# Patient Record
Sex: FEMALE | Race: WHITE | NOT HISPANIC OR LATINO | URBAN - METROPOLITAN AREA
[De-identification: names, ages, dates, MRNs, and addresses within clinical notes are randomized per-mention and may not be internally consistent; named-entity substitution may affect disease eponyms.]

---

## 2017-08-03 ENCOUNTER — OUTPATIENT (OUTPATIENT)
Dept: OUTPATIENT SERVICES | Facility: HOSPITAL | Age: 62
LOS: 1 days | Discharge: HOME | End: 2017-08-03

## 2017-08-03 DIAGNOSIS — R31.9 HEMATURIA, UNSPECIFIED: ICD-10-CM

## 2017-08-03 DIAGNOSIS — R19.7 DIARRHEA, UNSPECIFIED: ICD-10-CM

## 2017-08-03 DIAGNOSIS — E78.5 HYPERLIPIDEMIA, UNSPECIFIED: ICD-10-CM

## 2017-08-03 DIAGNOSIS — D55.9 ANEMIA DUE TO ENZYME DISORDER, UNSPECIFIED: ICD-10-CM

## 2017-08-03 DIAGNOSIS — D64.9 ANEMIA, UNSPECIFIED: ICD-10-CM

## 2017-08-03 DIAGNOSIS — E11.00 TYPE 2 DIABETES MELLITUS WITH HYPEROSMOLARITY WITHOUT NONKETOTIC HYPERGLYCEMIC-HYPEROSMOLAR COMA (NKHHC): ICD-10-CM

## 2017-08-03 DIAGNOSIS — E03.9 HYPOTHYROIDISM, UNSPECIFIED: ICD-10-CM

## 2017-08-03 DIAGNOSIS — R70.0 ELEVATED ERYTHROCYTE SEDIMENTATION RATE: ICD-10-CM

## 2017-08-03 DIAGNOSIS — E78.9 DISORDER OF LIPOPROTEIN METABOLISM, UNSPECIFIED: ICD-10-CM

## 2017-08-03 DIAGNOSIS — D51.9 VITAMIN B12 DEFICIENCY ANEMIA, UNSPECIFIED: ICD-10-CM

## 2017-08-03 DIAGNOSIS — D50.9 IRON DEFICIENCY ANEMIA, UNSPECIFIED: ICD-10-CM

## 2017-08-03 DIAGNOSIS — E09.9 DRUG OR CHEMICAL INDUCED DIABETES MELLITUS WITHOUT COMPLICATIONS: ICD-10-CM

## 2017-08-03 DIAGNOSIS — Z02.9 ENCOUNTER FOR ADMINISTRATIVE EXAMINATIONS, UNSPECIFIED: ICD-10-CM

## 2017-08-04 ENCOUNTER — OUTPATIENT (OUTPATIENT)
Dept: OUTPATIENT SERVICES | Facility: HOSPITAL | Age: 62
LOS: 1 days | Discharge: HOME | End: 2017-08-04

## 2017-08-04 DIAGNOSIS — R19.7 DIARRHEA, UNSPECIFIED: ICD-10-CM

## 2017-08-15 DIAGNOSIS — D50.9 IRON DEFICIENCY ANEMIA, UNSPECIFIED: ICD-10-CM

## 2017-08-15 DIAGNOSIS — E11.9 TYPE 2 DIABETES MELLITUS WITHOUT COMPLICATIONS: ICD-10-CM

## 2017-08-15 DIAGNOSIS — E55.9 VITAMIN D DEFICIENCY, UNSPECIFIED: ICD-10-CM

## 2017-08-15 DIAGNOSIS — Z79.899 OTHER LONG TERM (CURRENT) DRUG THERAPY: ICD-10-CM

## 2017-08-15 DIAGNOSIS — E78.6 LIPOPROTEIN DEFICIENCY: ICD-10-CM

## 2017-08-15 DIAGNOSIS — R31.9 HEMATURIA, UNSPECIFIED: ICD-10-CM

## 2017-08-15 DIAGNOSIS — R19.8 OTHER SPECIFIED SYMPTOMS AND SIGNS INVOLVING THE DIGESTIVE SYSTEM AND ABDOMEN: ICD-10-CM

## 2017-08-15 DIAGNOSIS — D64.3 OTHER SIDEROBLASTIC ANEMIAS: ICD-10-CM

## 2018-02-06 ENCOUNTER — EMERGENCY (EMERGENCY)
Facility: HOSPITAL | Age: 63
LOS: 1 days | Discharge: HOME | End: 2018-02-06
Attending: EMERGENCY MEDICINE

## 2018-02-06 VITALS
DIASTOLIC BLOOD PRESSURE: 92 MMHG | SYSTOLIC BLOOD PRESSURE: 140 MMHG | OXYGEN SATURATION: 95 % | RESPIRATION RATE: 20 BRPM | HEART RATE: 78 BPM

## 2018-02-06 DIAGNOSIS — L29.9 PRURITUS, UNSPECIFIED: ICD-10-CM

## 2018-02-06 DIAGNOSIS — R60.0 LOCALIZED EDEMA: ICD-10-CM

## 2018-02-06 DIAGNOSIS — E78.5 HYPERLIPIDEMIA, UNSPECIFIED: ICD-10-CM

## 2018-02-06 DIAGNOSIS — I10 ESSENTIAL (PRIMARY) HYPERTENSION: ICD-10-CM

## 2018-02-06 DIAGNOSIS — Z88.0 ALLERGY STATUS TO PENICILLIN: ICD-10-CM

## 2018-02-06 DIAGNOSIS — E11.9 TYPE 2 DIABETES MELLITUS WITHOUT COMPLICATIONS: ICD-10-CM

## 2018-02-06 DIAGNOSIS — Z79.899 OTHER LONG TERM (CURRENT) DRUG THERAPY: ICD-10-CM

## 2018-02-07 VITALS — TEMPERATURE: 98 F

## 2018-02-07 LAB
ALBUMIN SERPL ELPH-MCNC: 3.3 G/DL — SIGNIFICANT CHANGE UP (ref 3–5.5)
ALP SERPL-CCNC: 65 U/L — SIGNIFICANT CHANGE UP (ref 30–115)
ALT FLD-CCNC: 9 U/L — SIGNIFICANT CHANGE UP (ref 0–41)
ANION GAP SERPL CALC-SCNC: 6 MMOL/L — LOW (ref 7–14)
AST SERPL-CCNC: 11 U/L — SIGNIFICANT CHANGE UP (ref 0–41)
BASOPHILS # BLD AUTO: 0 K/UL — SIGNIFICANT CHANGE UP (ref 0–0.2)
BASOPHILS NFR BLD AUTO: 0 % — SIGNIFICANT CHANGE UP (ref 0–1)
BILIRUB SERPL-MCNC: 0.4 MG/DL — SIGNIFICANT CHANGE UP (ref 0.2–1.2)
BUN SERPL-MCNC: 27 MG/DL — HIGH (ref 10–20)
CALCIUM SERPL-MCNC: 9.2 MG/DL — SIGNIFICANT CHANGE UP (ref 8.5–10.1)
CHLORIDE SERPL-SCNC: 111 MMOL/L — HIGH (ref 98–110)
CO2 SERPL-SCNC: 26 MMOL/L — SIGNIFICANT CHANGE UP (ref 17–32)
CREAT SERPL-MCNC: 0.9 MG/DL — SIGNIFICANT CHANGE UP (ref 0.7–1.5)
EOSINOPHIL # BLD AUTO: 0.31 K/UL — SIGNIFICANT CHANGE UP (ref 0–0.7)
EOSINOPHIL NFR BLD AUTO: 4.8 % — SIGNIFICANT CHANGE UP (ref 0–8)
GLUCOSE SERPL-MCNC: 133 MG/DL — HIGH (ref 70–110)
HCT VFR BLD CALC: 32.7 % — LOW (ref 37–47)
HGB BLD-MCNC: 10.3 G/DL — LOW (ref 14–18)
IMM GRANULOCYTES NFR BLD AUTO: 0.3 % — SIGNIFICANT CHANGE UP (ref 0.1–0.3)
LYMPHOCYTES # BLD AUTO: 1.91 K/UL — SIGNIFICANT CHANGE UP (ref 1.2–3.4)
LYMPHOCYTES # BLD AUTO: 29.6 % — SIGNIFICANT CHANGE UP (ref 20.5–51.1)
MCHC RBC-ENTMCNC: 27 PG — SIGNIFICANT CHANGE UP (ref 27–31)
MCHC RBC-ENTMCNC: 31.5 G/DL — LOW (ref 32–37)
MCV RBC AUTO: 85.8 FL — SIGNIFICANT CHANGE UP (ref 81–91)
MONOCYTES # BLD AUTO: 0.6 K/UL — SIGNIFICANT CHANGE UP (ref 0.1–0.6)
MONOCYTES NFR BLD AUTO: 9.3 % — SIGNIFICANT CHANGE UP (ref 1.7–9.3)
NEUTROPHILS # BLD AUTO: 3.61 K/UL — SIGNIFICANT CHANGE UP (ref 1.4–6.5)
NEUTROPHILS NFR BLD AUTO: 56 % — SIGNIFICANT CHANGE UP (ref 42.2–75.2)
NRBC # BLD: 0 /100 WBCS — SIGNIFICANT CHANGE UP (ref 0–0)
PLATELET # BLD AUTO: 169 K/UL — SIGNIFICANT CHANGE UP (ref 130–400)
POTASSIUM SERPL-MCNC: 4.3 MMOL/L — SIGNIFICANT CHANGE UP (ref 3.5–5)
POTASSIUM SERPL-SCNC: 4.3 MMOL/L — SIGNIFICANT CHANGE UP (ref 3.5–5)
PROT SERPL-MCNC: 5.5 G/DL — LOW (ref 6–8)
RBC # BLD: 3.81 M/UL — LOW (ref 4.2–5.4)
RBC # FLD: 14 % — SIGNIFICANT CHANGE UP (ref 11.5–14.5)
SODIUM SERPL-SCNC: 143 MMOL/L — SIGNIFICANT CHANGE UP (ref 135–146)
WBC # BLD: 6.45 K/UL — SIGNIFICANT CHANGE UP (ref 4.8–10.8)
WBC # FLD AUTO: 6.45 K/UL — SIGNIFICANT CHANGE UP (ref 4.8–10.8)

## 2018-02-07 RX ORDER — DIPHENHYDRAMINE HCL 50 MG
50 CAPSULE ORAL EVERY 4 HOURS
Qty: 0 | Refills: 0 | Status: DISCONTINUED | OUTPATIENT
Start: 2018-02-07 | End: 2018-02-12

## 2018-02-07 RX ADMIN — Medication 60 MILLIGRAM(S): at 03:54

## 2018-02-07 RX ADMIN — Medication 50 MILLIGRAM(S): at 03:54

## 2018-02-07 NOTE — ED PROVIDER NOTE - PHYSICAL EXAMINATION
CONSTITUTIONAL: Well-developed; well-nourished; in no acute distress.   SKIN: warm, dry  HEAD: Normocephalic; atraumatic.  EYES: PERRL, EOMI, no conjunctival erythema  ENT: No nasal discharge; airway clear.  NECK: Supple; non tender.  CARD: S1, S2 normal; no murmurs, gallops, or rubs. Regular rate and rhythm.   RESP: No wheezes, rales or rhonchi.  ABD: soft ntnd  EXT: +mild pitting edema to hands & dorsal feet, R=L, 2 rings stuck on R 2nd digit & L 4th digit - removed w/ring cutter, digits nvi, cr<2s; normal ROM.  No clubbing, cyanosis.  LYMPH: No acute cervical adenopathy.  NEURO: Alert, oriented, grossly unremarkable  PSYCH: Cooperative, appropriate.

## 2018-02-07 NOTE — ED PROVIDER NOTE - NS ED ROS FT
Eyes:  No visual changes, eye pain or discharge.  ENMT:  No hearing changes, pain, no sore throat or runny nose, no difficulty swallowing  Cardiac:  No chest pain, SOB. No chest pain with exertion.  Respiratory:  No cough or respiratory distress. No hemoptysis. No history of asthma or RAD.  GI:  No nausea, vomiting, diarrhea or abdominal pain.  :  No dysuria, frequency or burning.  MS:  No myalgia, muscle weakness, joint pain or back pain.  Neuro:  No headache or weakness.  No LOC.  Skin:  see hpi  Endocrine: +dm

## 2018-02-07 NOTE — ED ADULT NURSE NOTE - PMH
Hypertension, unspecified type    Neuropathy    Other osteoporosis    Type 2 diabetes mellitus without complication, unspecified long term insulin use status

## 2018-04-04 ENCOUNTER — OUTPATIENT (OUTPATIENT)
Dept: OUTPATIENT SERVICES | Facility: HOSPITAL | Age: 63
LOS: 1 days | Discharge: HOME | End: 2018-04-04

## 2018-04-04 DIAGNOSIS — E07.9 DISORDER OF THYROID, UNSPECIFIED: ICD-10-CM

## 2018-04-04 DIAGNOSIS — R19.8 OTHER SPECIFIED SYMPTOMS AND SIGNS INVOLVING THE DIGESTIVE SYSTEM AND ABDOMEN: ICD-10-CM

## 2018-04-04 DIAGNOSIS — D51.9 VITAMIN B12 DEFICIENCY ANEMIA, UNSPECIFIED: ICD-10-CM

## 2018-04-04 DIAGNOSIS — E78.9 DISORDER OF LIPOPROTEIN METABOLISM, UNSPECIFIED: ICD-10-CM

## 2018-04-04 DIAGNOSIS — R31.9 HEMATURIA, UNSPECIFIED: ICD-10-CM

## 2018-04-04 DIAGNOSIS — E78.5 HYPERLIPIDEMIA, UNSPECIFIED: ICD-10-CM

## 2018-04-04 DIAGNOSIS — R94.5 ABNORMAL RESULTS OF LIVER FUNCTION STUDIES: ICD-10-CM

## 2018-04-04 DIAGNOSIS — I10 ESSENTIAL (PRIMARY) HYPERTENSION: ICD-10-CM

## 2018-04-04 DIAGNOSIS — E13.9 OTHER SPECIFIED DIABETES MELLITUS WITHOUT COMPLICATIONS: ICD-10-CM

## 2018-04-04 DIAGNOSIS — D50.9 IRON DEFICIENCY ANEMIA, UNSPECIFIED: ICD-10-CM

## 2018-04-04 DIAGNOSIS — D64.3 OTHER SIDEROBLASTIC ANEMIAS: ICD-10-CM

## 2018-04-04 DIAGNOSIS — E11.00 TYPE 2 DIABETES MELLITUS WITH HYPEROSMOLARITY WITHOUT NONKETOTIC HYPERGLYCEMIC-HYPEROSMOLAR COMA (NKHHC): ICD-10-CM

## 2018-04-04 DIAGNOSIS — D03.9 MELANOMA IN SITU, UNSPECIFIED: ICD-10-CM

## 2018-05-18 ENCOUNTER — OUTPATIENT (OUTPATIENT)
Dept: OUTPATIENT SERVICES | Facility: HOSPITAL | Age: 63
LOS: 1 days | Discharge: HOME | End: 2018-05-18

## 2018-05-18 DIAGNOSIS — E78.5 HYPERLIPIDEMIA, UNSPECIFIED: ICD-10-CM

## 2018-05-18 DIAGNOSIS — I10 ESSENTIAL (PRIMARY) HYPERTENSION: ICD-10-CM

## 2018-05-18 DIAGNOSIS — G62.9 POLYNEUROPATHY, UNSPECIFIED: ICD-10-CM

## 2018-12-23 ENCOUNTER — EMERGENCY (EMERGENCY)
Facility: HOSPITAL | Age: 63
LOS: 0 days | Discharge: HOME | End: 2018-12-23
Attending: EMERGENCY MEDICINE | Admitting: EMERGENCY MEDICINE

## 2018-12-23 VITALS
DIASTOLIC BLOOD PRESSURE: 59 MMHG | TEMPERATURE: 97 F | RESPIRATION RATE: 16 BRPM | SYSTOLIC BLOOD PRESSURE: 127 MMHG | OXYGEN SATURATION: 96 % | HEART RATE: 80 BPM

## 2018-12-23 VITALS
RESPIRATION RATE: 15 BRPM | OXYGEN SATURATION: 98 % | DIASTOLIC BLOOD PRESSURE: 69 MMHG | SYSTOLIC BLOOD PRESSURE: 131 MMHG | HEART RATE: 81 BPM

## 2018-12-23 DIAGNOSIS — F19.10 OTHER PSYCHOACTIVE SUBSTANCE ABUSE, UNCOMPLICATED: ICD-10-CM

## 2018-12-23 DIAGNOSIS — Z88.0 ALLERGY STATUS TO PENICILLIN: ICD-10-CM

## 2018-12-23 DIAGNOSIS — R55 SYNCOPE AND COLLAPSE: ICD-10-CM

## 2018-12-23 DIAGNOSIS — E11.9 TYPE 2 DIABETES MELLITUS WITHOUT COMPLICATIONS: ICD-10-CM

## 2018-12-23 DIAGNOSIS — E78.5 HYPERLIPIDEMIA, UNSPECIFIED: ICD-10-CM

## 2018-12-23 DIAGNOSIS — Z79.899 OTHER LONG TERM (CURRENT) DRUG THERAPY: ICD-10-CM

## 2018-12-23 LAB
ALBUMIN SERPL ELPH-MCNC: 4 G/DL — SIGNIFICANT CHANGE UP (ref 3.5–5.2)
ALP SERPL-CCNC: 76 U/L — SIGNIFICANT CHANGE UP (ref 30–115)
ALT FLD-CCNC: 7 U/L — SIGNIFICANT CHANGE UP (ref 0–41)
ANION GAP SERPL CALC-SCNC: 16 MMOL/L — HIGH (ref 7–14)
APAP SERPL-MCNC: <5 UG/ML — LOW (ref 10–30)
AST SERPL-CCNC: 20 U/L — SIGNIFICANT CHANGE UP (ref 0–41)
BASOPHILS # BLD AUTO: 0.01 K/UL — SIGNIFICANT CHANGE UP (ref 0–0.2)
BASOPHILS NFR BLD AUTO: 0.1 % — SIGNIFICANT CHANGE UP (ref 0–1)
BILIRUB SERPL-MCNC: <0.2 MG/DL — SIGNIFICANT CHANGE UP (ref 0.2–1.2)
BUN SERPL-MCNC: 40 MG/DL — HIGH (ref 10–20)
CALCIUM SERPL-MCNC: 9.4 MG/DL — SIGNIFICANT CHANGE UP (ref 8.5–10.1)
CHLORIDE SERPL-SCNC: 98 MMOL/L — SIGNIFICANT CHANGE UP (ref 98–110)
CO2 SERPL-SCNC: 22 MMOL/L — SIGNIFICANT CHANGE UP (ref 17–32)
CREAT SERPL-MCNC: 1.7 MG/DL — HIGH (ref 0.7–1.5)
EOSINOPHIL # BLD AUTO: 0.2 K/UL — SIGNIFICANT CHANGE UP (ref 0–0.7)
EOSINOPHIL NFR BLD AUTO: 2.8 % — SIGNIFICANT CHANGE UP (ref 0–8)
ETHANOL SERPL-MCNC: <10 MG/DL — HIGH
GAS PNL BLDV: SIGNIFICANT CHANGE UP
GLUCOSE SERPL-MCNC: 118 MG/DL — HIGH (ref 70–99)
HCT VFR BLD CALC: 33.3 % — LOW (ref 37–47)
HGB BLD-MCNC: 10.1 G/DL — LOW (ref 12–16)
IMM GRANULOCYTES NFR BLD AUTO: 0.4 % — HIGH (ref 0.1–0.3)
LYMPHOCYTES # BLD AUTO: 1.61 K/UL — SIGNIFICANT CHANGE UP (ref 1.2–3.4)
LYMPHOCYTES # BLD AUTO: 22.3 % — SIGNIFICANT CHANGE UP (ref 20.5–51.1)
MCHC RBC-ENTMCNC: 26.4 PG — LOW (ref 27–31)
MCHC RBC-ENTMCNC: 30.3 G/DL — LOW (ref 32–37)
MCV RBC AUTO: 87.2 FL — SIGNIFICANT CHANGE UP (ref 81–99)
MONOCYTES # BLD AUTO: 0.8 K/UL — HIGH (ref 0.1–0.6)
MONOCYTES NFR BLD AUTO: 11.1 % — HIGH (ref 1.7–9.3)
NEUTROPHILS # BLD AUTO: 4.58 K/UL — SIGNIFICANT CHANGE UP (ref 1.4–6.5)
NEUTROPHILS NFR BLD AUTO: 63.3 % — SIGNIFICANT CHANGE UP (ref 42.2–75.2)
PLATELET # BLD AUTO: 204 K/UL — SIGNIFICANT CHANGE UP (ref 130–400)
POTASSIUM SERPL-MCNC: 5.5 MMOL/L — HIGH (ref 3.5–5)
POTASSIUM SERPL-SCNC: 5.5 MMOL/L — HIGH (ref 3.5–5)
PROT SERPL-MCNC: 6.9 G/DL — SIGNIFICANT CHANGE UP (ref 6–8)
RBC # BLD: 3.82 M/UL — LOW (ref 4.2–5.4)
RBC # FLD: 14.3 % — SIGNIFICANT CHANGE UP (ref 11.5–14.5)
SALICYLATES SERPL-MCNC: <0.3 MG/DL — LOW (ref 4–30)
SODIUM SERPL-SCNC: 136 MMOL/L — SIGNIFICANT CHANGE UP (ref 135–146)
TROPONIN T SERPL-MCNC: <0.01 NG/ML — SIGNIFICANT CHANGE UP
WBC # BLD: 7.23 K/UL — SIGNIFICANT CHANGE UP (ref 4.8–10.8)
WBC # FLD AUTO: 7.23 K/UL — SIGNIFICANT CHANGE UP (ref 4.8–10.8)

## 2018-12-23 NOTE — ED PROVIDER NOTE - ATTENDING CONTRIBUTION TO CARE
63 F to ED with syncope? bystander called EMs and she was found down.  responded to naloxone by EMS  and has no complaints.  pt somnolent but rousable states she was shopping at [employer]    takes 4mg dilaudid Q4h as prescribed.    AVSS, exam as noted, CTAB, RRR, abdomen soft NTND, (+) bowel sounds, neuro nonfocal

## 2018-12-23 NOTE — ED PROVIDER NOTE - NS ED ROS FT
Eyes:  No visual changes, eye pain or discharge.  ENMT: no sore throat or runny nose, no difficulty swallowing  Cardiac:  No chest pain, SOB   Respiratory:  No cough or respiratory distress.    GI:  No nausea, vomiting, diarrhea or abdominal pain.  :  No dysuria, frequency or burning.  MS:  No  joint pain or back pain.  Neuro:  No headache or weakness.   Skin:  No skin rash.   Endocrine: No history of thyroid disease or diabetes.

## 2018-12-23 NOTE — ED PROVIDER NOTE - NSFOLLOWUPINSTRUCTIONS_ED_ALL_ED_FT
Please follow up with your primary care physician in 1-2 days.     Substance Abuse    Chemical dependency is an addiction to drugs or alcohol. It is characterized by the repeated behavior of seeking out and using drugs and alcohol despite harmful consequences to the health and safety of oneself and others. Using drugs in a manner that brought you to an Emergency Room suggests you may have an drug abuse problem. Seek help at a drug addiction center.    SEEK IMMEDIATE MEDICAL CARE IF YOU HAVE ANY OF THE FOLLOWING SYMPTOMS: chest pain, shortness of breath, change in mental status, thoughts about hurting killing yourself, thoughts about hurting or killing somebody else, hallucinations, or worsening depression.

## 2018-12-23 NOTE — ED PROVIDER NOTE - PHYSICAL EXAMINATION
CONSTITUTIONAL: Well-developed; well-nourished; in no acute distress.   SKIN: warm, dry  HEAD: Normocephalic; atraumatic.  EYES: PERRL, EOMI, no conjunctival erythema, 3mm pupils reactive bilaterally   ENT: No nasal discharge; airway clear.  NECK: Supple; non tender.  CARD: S1, S2 normal;  Regular rate and rhythm.   RESP: No wheezes, rales or rhonchi. good air movement bilaterally   ABD: soft non tender, non distended, no rebound or guarding  EXT: Normal ROM.    LYMPH: No acute cervical adenopathy.  NEURO: Alert, oriented, grossly unremarkable.

## 2018-12-23 NOTE — ED PROVIDER NOTE - OBJECTIVE STATEMENT
62 yo F pmh of DM, HLD, back pain, neuropathy on dilaudid and lyrica from chronic pain presents with syncopal episode. Patient was [place] the [employer] when she bent over and then was helped up by EMS. She is unclear of circumstances. As per EMS they found her unresponsive and gave narcan with some improvement. Currently patient is awake, alert and answering questions. no CP, no sob, no n/v/d, no abdominal pain.

## 2018-12-23 NOTE — ED ADULT NURSE NOTE - OBJECTIVE STATEMENT
Pt was brought in by EMS for syncopal episode at [facility]. Pt states she took her normal dose of Dilaudid that shes been taking for years for chronic pain. narcan administered by EMS

## 2018-12-24 PROBLEM — M81.8 OTHER OSTEOPOROSIS WITHOUT CURRENT PATHOLOGICAL FRACTURE: Chronic | Status: ACTIVE | Noted: 2018-02-07

## 2018-12-24 PROBLEM — G62.9 POLYNEUROPATHY, UNSPECIFIED: Chronic | Status: ACTIVE | Noted: 2018-02-07

## 2018-12-24 PROBLEM — I10 ESSENTIAL (PRIMARY) HYPERTENSION: Chronic | Status: ACTIVE | Noted: 2018-02-07

## 2018-12-24 PROBLEM — E11.9 TYPE 2 DIABETES MELLITUS WITHOUT COMPLICATIONS: Chronic | Status: ACTIVE | Noted: 2018-02-07

## 2019-04-18 ENCOUNTER — OUTPATIENT (OUTPATIENT)
Dept: OUTPATIENT SERVICES | Facility: HOSPITAL | Age: 64
LOS: 1 days | Discharge: HOME | End: 2019-04-18

## 2019-04-18 DIAGNOSIS — E11.9 TYPE 2 DIABETES MELLITUS WITHOUT COMPLICATIONS: ICD-10-CM

## 2019-04-18 DIAGNOSIS — E78.5 HYPERLIPIDEMIA, UNSPECIFIED: ICD-10-CM

## 2019-04-18 DIAGNOSIS — I50.32 CHRONIC DIASTOLIC (CONGESTIVE) HEART FAILURE: ICD-10-CM

## 2019-04-18 DIAGNOSIS — I10 ESSENTIAL (PRIMARY) HYPERTENSION: ICD-10-CM

## 2019-06-05 ENCOUNTER — OUTPATIENT (OUTPATIENT)
Dept: OUTPATIENT SERVICES | Facility: HOSPITAL | Age: 64
LOS: 1 days | Discharge: HOME | End: 2019-06-05

## 2019-06-05 DIAGNOSIS — M15.0 PRIMARY GENERALIZED (OSTEO)ARTHRITIS: ICD-10-CM

## 2019-06-05 DIAGNOSIS — M96.1 POSTLAMINECTOMY SYNDROME, NOT ELSEWHERE CLASSIFIED: ICD-10-CM

## 2019-06-05 DIAGNOSIS — M54.89 OTHER DORSALGIA: ICD-10-CM

## 2019-11-14 ENCOUNTER — OUTPATIENT (OUTPATIENT)
Dept: OUTPATIENT SERVICES | Facility: HOSPITAL | Age: 64
LOS: 1 days | Discharge: HOME | End: 2019-11-14

## 2019-11-14 DIAGNOSIS — N18.3 CHRONIC KIDNEY DISEASE, STAGE 3 (MODERATE): ICD-10-CM

## 2019-11-19 ENCOUNTER — OUTPATIENT (OUTPATIENT)
Dept: OUTPATIENT SERVICES | Facility: HOSPITAL | Age: 64
LOS: 1 days | Discharge: HOME | End: 2019-11-19

## 2019-11-19 DIAGNOSIS — N18.9 CHRONIC KIDNEY DISEASE, UNSPECIFIED: ICD-10-CM

## 2020-05-18 ENCOUNTER — EMERGENCY (EMERGENCY)
Facility: HOSPITAL | Age: 65
LOS: 0 days | Discharge: HOME | End: 2020-05-18
Attending: EMERGENCY MEDICINE | Admitting: EMERGENCY MEDICINE
Payer: COMMERCIAL

## 2020-05-18 VITALS
RESPIRATION RATE: 18 BRPM | HEART RATE: 78 BPM | SYSTOLIC BLOOD PRESSURE: 128 MMHG | TEMPERATURE: 97 F | OXYGEN SATURATION: 98 % | DIASTOLIC BLOOD PRESSURE: 68 MMHG

## 2020-05-18 DIAGNOSIS — E78.5 HYPERLIPIDEMIA, UNSPECIFIED: ICD-10-CM

## 2020-05-18 DIAGNOSIS — M25.562 PAIN IN LEFT KNEE: ICD-10-CM

## 2020-05-18 DIAGNOSIS — Z88.0 ALLERGY STATUS TO PENICILLIN: ICD-10-CM

## 2020-05-18 DIAGNOSIS — E11.9 TYPE 2 DIABETES MELLITUS WITHOUT COMPLICATIONS: ICD-10-CM

## 2020-05-18 DIAGNOSIS — I10 ESSENTIAL (PRIMARY) HYPERTENSION: ICD-10-CM

## 2020-05-18 PROCEDURE — 93970 EXTREMITY STUDY: CPT | Mod: 26

## 2020-05-18 PROCEDURE — 99284 EMERGENCY DEPT VISIT MOD MDM: CPT

## 2020-05-18 PROCEDURE — 73562 X-RAY EXAM OF KNEE 3: CPT | Mod: 26,LT

## 2020-05-18 NOTE — ED PROVIDER NOTE - NS ED ROS FT
Review of Systems:  	•	CONSTITUTIONAL: no fever, no diaphoresis, no chills  	•	SKIN: no rash  	•	HEMATOLOGIC: no bleeding, no bruising  	•	MUSCULOSKELETAL: +L knee pain, +swelling. no redness   	•	NEUROLOGIC: no weakness, no numbness. no syncope

## 2020-05-18 NOTE — ED PROVIDER NOTE - CARE PROVIDER_API CALL
your primary care doctor,   Phone: (   )    -  Fax: (   )    -  Follow Up Time: 1-3 Days    Naresh Rivers  ORTHOPAEDIC SURGERY  [address] [place] [id number]  Phone: [phone]  Fax: [phone]  Follow Up Time: 1-3 Days

## 2020-05-18 NOTE — ED PROVIDER NOTE - OBJECTIVE STATEMENT
64 year old female, past medical history HTN, HDL, DM, OA, neuropathy, who presents with L knee pain x4 days. Patient reports chronic b/l knee pain,  managed by pain management w/ steroid injections. States R knee w/o worsening pain, however, reports gradual worsening of L knee pain w/ swelling. No recent fall or trauma. No hx blood clots, hemoptysis, estrogen use, calf tenderness. patient non-smoker. No fever, chills, hip/ankle pain, skin changes.

## 2020-05-18 NOTE — ED PROVIDER NOTE - NSFOLLOWUPCLINICS_GEN_ALL_ED_FT
[facility]  Orthpedic  [address], [place]   Phone: [phone]  Fax:   Follow Up Time: 1-3 Days

## 2020-05-18 NOTE — ED PROVIDER NOTE - PHYSICAL EXAMINATION
CONST: Well appearing in NAD  MS: LLE: With swelling and tenderness to knee, no overlying skin changes, no calf tenderness. Passive/active knee extension/flexion intact. Pelvis stable. Pulses 2+ bilaterally. No midline spinal tenderness  SKIN: Warm, dry, no acute rashes.  NEURO: A&Ox3, No focal deficits. Strength 5/5 LE bilaterally with no sensory deficits. Steady gait.

## 2020-05-18 NOTE — ED PROVIDER NOTE - PATIENT PORTAL LINK FT
You can access the FollowMyHealth Patient Portal offered by [facility] by registering at the following website: [email or web address]. By joining [email or web address]’s FollowMyHealth portal, you will also be able to view your health information using other applications (apps) compatible with our system.

## 2020-05-18 NOTE — ED PROVIDER NOTE - ATTENDING CONTRIBUTION TO CARE
I personally evaluated the patient. I reviewed the Resident’s or Physician Assistant’s note (as assigned above), and agree with the findings and plan except as documented in my note.    Pt is a 65 y/o female with PMH of HTN, DLD, DM, chronic knee pain, presents to ED for worsening L knee pain for several days, mild, constant, no change since onset. Pt states has received injections in past that have helped. No recent trauma. No SOB, chest pain.    Constitutional: Well developed, well nourished. NAD.  Head: Normocephalic.  Eyes: EOMI.  Cardiovascular: Normal S1, S2. Regular rate and rhythm. No murmurs, rubs, or gallops.  Pulmonary: Normal respiratory rate and effort. Lungs clear to auscultation bilaterally. No wheezing, rales, or rhonchi.  MS: + left knee tenderness, diffuse, NVI distally. Able to ambulate.  Neuro: No focal neurological deficits.

## 2020-05-18 NOTE — ED ADULT TRIAGE NOTE - CHIEF COMPLAINT QUOTE
Pt c/o b/l knee pain; pain is chronic. Pt gets injections in b/l knees for the pain, last being in January. Pain in her left knee is currently severe, making it difficult to bear weight/ambulate. MD sent pt in to r/o blood clot.

## 2020-05-18 NOTE — ED PROVIDER NOTE - PROVIDER TOKENS
FREE:[LAST:[your primary care doctor],PHONE:[(   )    -],FAX:[(   )    -],FOLLOWUP:[1-3 Days]],PROVIDER:[TOKEN:[[id number]],FOLLOWUP:[1-3 Days]]

## 2020-05-18 NOTE — ED PROVIDER NOTE - CLINICAL SUMMARY MEDICAL DECISION MAKING FREE TEXT BOX
Pt presented to ED for acute on chronic knee pain, no recent trauma. XR and venous duplex obtained. No acute fx/dislocation or DVT. Will d/c. Results reviewed and discussed with pt and printed for patient. Anticipatory guidance given including close outpatient followup. Strict return precautions given. Pt verbalizes understanding of and agrees with plan.

## 2020-05-18 NOTE — ED PROVIDER NOTE - CARE PROVIDERS DIRECT ADDRESSES
,DirectAddress_Unknown,[email or web address]

## 2020-05-18 NOTE — ED PROVIDER NOTE - NSFOLLOWUPINSTRUCTIONS_ED_ALL_ED_FT
Please follow up with your primary care doctor, pain management and orthopedics in 1-3 days  Please be aware of any new or worsening signs or symptoms that should prompt your return to the ER     Knee Pain    Knee pain is a very common symptom and can have many causes. Knee pain often goes away when you follow your health care provider's instructions for relieving pain and discomfort at home. However, knee pain can develop into a condition that needs treatment. Some conditions may include:     Arthritis caused by wear and tear (osteoarthritis).  Arthritis caused by swelling and irritation (rheumatoid arthritis or gout).  A cyst or growth in your knee.  An infection in your knee joint.  An injury that will not heal.  Damage, swelling, or irritation of the tissues that support your knee (torn ligaments or tendinitis).    If your knee pain continues, additional tests may be ordered to diagnose your condition. Tests may include X-rays or other imaging studies of your knee. You may also need to have fluid removed from your knee. Treatment for ongoing knee pain depends on the cause, but treatment may include:    Medicines to relieve pain or swelling.  Steroid injections in your knee.  Physical therapy.  Surgery.    HOME CARE INSTRUCTIONS  Take medicines only as directed by your health care provider.   Rest your knee and keep it raised (elevated) while you are resting.  Do not do things that cause or worsen pain.  Avoid high-impact activities or exercises, such as running, jumping rope, or doing jumping jacks.  Apply ice to the knee area:  Put ice in a plastic bag.  Place a towel between your skin and the bag.  Leave the ice on for 20 minutes, 2–3 times a day.  Ask your health care provider if you should wear an elastic knee support.  Keep a pillow under your knee when you sleep.  Lose weight if you are overweight. Extra weight can put pressure on your knee.  Do not use any tobacco products, including cigarettes, chewing tobacco, or electronic cigarettes. If you need help quitting, ask your health care provider. Smoking may slow the healing of any bone and joint problems that you may have.    SEEK MEDICAL CARE IF:  Your knee pain continues, changes, or gets worse.  You have a fever along with knee pain.  Your knee justine or locks up.  Your knee becomes more swollen.    SEEK IMMEDIATE MEDICAL CARE IF:  Your knee joint feels hot to the touch.  You have chest pain or trouble breathing.

## 2020-05-18 NOTE — ED PROVIDER NOTE - PROGRESS NOTE DETAILS
I supervised PA fellow care vascular negative. Results d/w patient and copies of results provided.  Pt instructed to return if any worsening symptoms or concerns.  Discussed with patient follow up and care plan. They verbalize understanding all discussed and all questions answered.

## 2021-02-01 PROBLEM — Z00.00 ENCOUNTER FOR PREVENTIVE HEALTH EXAMINATION: Status: ACTIVE | Noted: 2021-02-01

## 2021-02-03 ENCOUNTER — APPOINTMENT (OUTPATIENT)
Dept: RHEUMATOLOGY | Facility: CLINIC | Age: 66
End: 2021-02-03

## 2021-02-19 ENCOUNTER — APPOINTMENT (OUTPATIENT)
Dept: RHEUMATOLOGY | Facility: CLINIC | Age: 66
End: 2021-02-19
Payer: MEDICARE

## 2021-02-19 DIAGNOSIS — E78.5 HYPERLIPIDEMIA, UNSPECIFIED: ICD-10-CM

## 2021-02-19 DIAGNOSIS — M17.10 UNILATERAL PRIMARY OSTEOARTHRITIS, UNSPECIFIED KNEE: ICD-10-CM

## 2021-02-19 DIAGNOSIS — M25.50 PAIN IN UNSPECIFIED JOINT: ICD-10-CM

## 2021-02-19 DIAGNOSIS — E11.9 TYPE 2 DIABETES MELLITUS W/OUT COMPLICATIONS: ICD-10-CM

## 2021-02-19 PROCEDURE — 99443: CPT

## 2021-02-19 NOTE — REVIEW OF SYSTEMS
[As Noted in HPI] : as noted in HPI [Arthralgias] : arthralgias [Joint Pain] : joint pain [Joint Swelling] : joint swelling [Negative] : Psychiatric

## 2021-02-21 NOTE — REASON FOR VISIT
[Initial Evaluation] : an initial evaluation [Home] : at home, [unfilled] , at the time of the visit. [Medical Office: ([facility])___] : at the medical office located in  [Verbal consent obtained from patient] : the patient, [unfilled]

## 2021-02-21 NOTE — ASSESSMENT
[FreeTextEntry1] : Osteoarthritis with multiple joints involvement:\par knee xray done last year (see HIE) shows b/l degenerative disease as well as proliferative disease.\par will recommend xray shoulder and ankle \par lab work for inflammatory markers to r/o systematic inflammatory disease\par pain control as per pain management \par will recommend discussing with primary to start on cymbalta pending work up\par follow up in 6 weeks in person visit for further management.

## 2021-02-21 NOTE — HISTORY OF PRESENT ILLNESS
[Home] : at home, [unfilled] , at the time of the visit. [Medical Office: ([facility])___] : at the medical office located in  [Verbal consent obtained from patient] : the patient, [unfilled] [Arthralgias] : arthralgias [Joint Swelling] : joint swelling [Difficulty Standing] : difficulty standing [Difficulty Walking] : difficulty walking [FreeTextEntry1] : 66 yo male with PMH of osteoarthritis, CKD?stage, HTN, DM, neuropathy had telephonic appointment today.\par \par she complains of multiple joint pain. she has h/o multiple back surgery and currently following with pain management doctor and takes dilaudid 16 mg SR twice daily.\par \par she has b/l knee arthritis and was given multiple steroid injection in past which helped her for couple of months and pain recurs again.\par \par she also reports that she has pain in shoulders and ankle.\par \par she has poor ambulatory status due to pain and was referred to rheum by her pain management doctor. [Anorexia] : no anorexia [Weight Loss] : no weight loss [Malar Facial Rash] : no malar facial rash [Cough] : no cough

## 2021-02-24 ENCOUNTER — APPOINTMENT (OUTPATIENT)
Dept: RHEUMATOLOGY | Facility: CLINIC | Age: 66
End: 2021-02-24

## 2021-02-25 ENCOUNTER — NON-APPOINTMENT (OUTPATIENT)
Age: 66
End: 2021-02-25

## 2021-04-23 ENCOUNTER — RESULT REVIEW (OUTPATIENT)
Age: 66
End: 2021-04-23

## 2021-04-23 ENCOUNTER — OUTPATIENT (OUTPATIENT)
Dept: OUTPATIENT SERVICES | Facility: HOSPITAL | Age: 66
LOS: 1 days | Discharge: HOME | End: 2021-04-23
Payer: MEDICARE

## 2021-04-23 DIAGNOSIS — M25.569 PAIN IN UNSPECIFIED KNEE: ICD-10-CM

## 2021-04-23 DIAGNOSIS — M25.539 PAIN IN UNSPECIFIED WRIST: ICD-10-CM

## 2021-04-23 DIAGNOSIS — M25.519 PAIN IN UNSPECIFIED SHOULDER: ICD-10-CM

## 2021-04-23 DIAGNOSIS — M79.673 PAIN IN UNSPECIFIED FOOT: ICD-10-CM

## 2021-04-23 DIAGNOSIS — M79.643 PAIN IN UNSPECIFIED HAND: ICD-10-CM

## 2021-04-23 PROCEDURE — 73630 X-RAY EXAM OF FOOT: CPT | Mod: 26,50

## 2021-04-23 PROCEDURE — 73130 X-RAY EXAM OF HAND: CPT | Mod: 26,50

## 2021-04-23 PROCEDURE — 73110 X-RAY EXAM OF WRIST: CPT | Mod: 26,50

## 2021-04-23 PROCEDURE — 73564 X-RAY EXAM KNEE 4 OR MORE: CPT | Mod: 26,50

## 2021-04-23 PROCEDURE — 73610 X-RAY EXAM OF ANKLE: CPT | Mod: 26,50

## 2021-04-23 PROCEDURE — 73030 X-RAY EXAM OF SHOULDER: CPT | Mod: 26,50

## 2021-06-18 ENCOUNTER — APPOINTMENT (OUTPATIENT)
Dept: RHEUMATOLOGY | Facility: CLINIC | Age: 66
End: 2021-06-18

## 2022-01-10 ENCOUNTER — TRANSCRIPTION ENCOUNTER (OUTPATIENT)
Age: 67
End: 2022-01-10

## 2022-04-08 ENCOUNTER — INPATIENT (INPATIENT)
Facility: HOSPITAL | Age: 67
LOS: 3 days | Discharge: HOME | End: 2022-04-12
Attending: HOSPITALIST | Admitting: HOSPITALIST
Payer: MEDICARE

## 2022-04-08 VITALS
HEART RATE: 72 BPM | DIASTOLIC BLOOD PRESSURE: 38 MMHG | TEMPERATURE: 97 F | WEIGHT: 154.98 LBS | RESPIRATION RATE: 18 BRPM | OXYGEN SATURATION: 97 % | SYSTOLIC BLOOD PRESSURE: 83 MMHG

## 2022-04-08 LAB
ALBUMIN SERPL ELPH-MCNC: 4.1 G/DL — SIGNIFICANT CHANGE UP (ref 3.5–5.2)
ALP SERPL-CCNC: 53 U/L — SIGNIFICANT CHANGE UP (ref 30–115)
ALT FLD-CCNC: 11 U/L — SIGNIFICANT CHANGE UP (ref 0–41)
ANION GAP SERPL CALC-SCNC: 10 MMOL/L — SIGNIFICANT CHANGE UP (ref 7–14)
APPEARANCE UR: ABNORMAL
APTT BLD: 35.8 SEC — SIGNIFICANT CHANGE UP (ref 27–39.2)
AST SERPL-CCNC: 30 U/L — SIGNIFICANT CHANGE UP (ref 0–41)
BACTERIA # UR AUTO: NEGATIVE — SIGNIFICANT CHANGE UP
BASOPHILS # BLD AUTO: 0.01 K/UL — SIGNIFICANT CHANGE UP (ref 0–0.2)
BASOPHILS NFR BLD AUTO: 0.2 % — SIGNIFICANT CHANGE UP (ref 0–1)
BILIRUB SERPL-MCNC: 0.4 MG/DL — SIGNIFICANT CHANGE UP (ref 0.2–1.2)
BILIRUB UR-MCNC: NEGATIVE — SIGNIFICANT CHANGE UP
BUN SERPL-MCNC: 44 MG/DL — HIGH (ref 10–20)
CALCIUM SERPL-MCNC: 9.7 MG/DL — SIGNIFICANT CHANGE UP (ref 8.5–10.1)
CHLORIDE SERPL-SCNC: 102 MMOL/L — SIGNIFICANT CHANGE UP (ref 98–110)
CK SERPL-CCNC: 875 U/L — HIGH (ref 0–225)
CO2 SERPL-SCNC: 26 MMOL/L — SIGNIFICANT CHANGE UP (ref 17–32)
COLOR SPEC: YELLOW — SIGNIFICANT CHANGE UP
CREAT SERPL-MCNC: 3.3 MG/DL — HIGH (ref 0.7–1.5)
DIFF PNL FLD: ABNORMAL
EGFR: 15 ML/MIN/1.73M2 — LOW
EOSINOPHIL # BLD AUTO: 0.23 K/UL — SIGNIFICANT CHANGE UP (ref 0–0.7)
EOSINOPHIL NFR BLD AUTO: 4.2 % — SIGNIFICANT CHANGE UP (ref 0–8)
EPI CELLS # UR: 20 /HPF — HIGH (ref 0–5)
ETHANOL SERPL-MCNC: <10 MG/DL — SIGNIFICANT CHANGE UP
GLUCOSE SERPL-MCNC: 108 MG/DL — HIGH (ref 70–99)
GLUCOSE UR QL: NEGATIVE — SIGNIFICANT CHANGE UP
HCT VFR BLD CALC: 36.7 % — LOW (ref 37–47)
HGB BLD-MCNC: 11.3 G/DL — LOW (ref 12–16)
HYALINE CASTS # UR AUTO: 5 /LPF — SIGNIFICANT CHANGE UP (ref 0–7)
IMM GRANULOCYTES NFR BLD AUTO: 0.4 % — HIGH (ref 0.1–0.3)
INR BLD: 1.03 RATIO — SIGNIFICANT CHANGE UP (ref 0.65–1.3)
KETONES UR-MCNC: NEGATIVE — SIGNIFICANT CHANGE UP
LACTATE SERPL-SCNC: 0.9 MMOL/L — SIGNIFICANT CHANGE UP (ref 0.7–2)
LEUKOCYTE ESTERASE UR-ACNC: ABNORMAL
LIDOCAIN IGE QN: 27 U/L — SIGNIFICANT CHANGE UP (ref 7–60)
LYMPHOCYTES # BLD AUTO: 1.41 K/UL — SIGNIFICANT CHANGE UP (ref 1.2–3.4)
LYMPHOCYTES # BLD AUTO: 26 % — SIGNIFICANT CHANGE UP (ref 20.5–51.1)
MCHC RBC-ENTMCNC: 27.6 PG — SIGNIFICANT CHANGE UP (ref 27–31)
MCHC RBC-ENTMCNC: 30.8 G/DL — LOW (ref 32–37)
MCV RBC AUTO: 89.5 FL — SIGNIFICANT CHANGE UP (ref 81–99)
MONOCYTES # BLD AUTO: 0.86 K/UL — HIGH (ref 0.1–0.6)
MONOCYTES NFR BLD AUTO: 15.9 % — HIGH (ref 1.7–9.3)
NEUTROPHILS # BLD AUTO: 2.89 K/UL — SIGNIFICANT CHANGE UP (ref 1.4–6.5)
NEUTROPHILS NFR BLD AUTO: 53.3 % — SIGNIFICANT CHANGE UP (ref 42.2–75.2)
NITRITE UR-MCNC: NEGATIVE — SIGNIFICANT CHANGE UP
NRBC # BLD: 0 /100 WBCS — SIGNIFICANT CHANGE UP (ref 0–0)
PH UR: 6 — SIGNIFICANT CHANGE UP (ref 5–8)
PLATELET # BLD AUTO: 123 K/UL — LOW (ref 130–400)
POTASSIUM SERPL-MCNC: 4.4 MMOL/L — SIGNIFICANT CHANGE UP (ref 3.5–5)
POTASSIUM SERPL-SCNC: 4.4 MMOL/L — SIGNIFICANT CHANGE UP (ref 3.5–5)
PROT SERPL-MCNC: 6.8 G/DL — SIGNIFICANT CHANGE UP (ref 6–8)
PROT UR-MCNC: ABNORMAL
PROTHROM AB SERPL-ACNC: 11.8 SEC — SIGNIFICANT CHANGE UP (ref 9.95–12.87)
RBC # BLD: 4.1 M/UL — LOW (ref 4.2–5.4)
RBC # FLD: 14 % — SIGNIFICANT CHANGE UP (ref 11.5–14.5)
RBC CASTS # UR COMP ASSIST: 3 /HPF — SIGNIFICANT CHANGE UP (ref 0–4)
SODIUM SERPL-SCNC: 138 MMOL/L — SIGNIFICANT CHANGE UP (ref 135–146)
SP GR SPEC: 1.01 — SIGNIFICANT CHANGE UP (ref 1.01–1.03)
TROPONIN T SERPL-MCNC: <0.01 NG/ML — SIGNIFICANT CHANGE UP
UROBILINOGEN FLD QL: SIGNIFICANT CHANGE UP
WBC # BLD: 5.42 K/UL — SIGNIFICANT CHANGE UP (ref 4.8–10.8)
WBC # FLD AUTO: 5.42 K/UL — SIGNIFICANT CHANGE UP (ref 4.8–10.8)
WBC UR QL: 22 /HPF — HIGH (ref 0–5)

## 2022-04-08 PROCEDURE — 99285 EMERGENCY DEPT VISIT HI MDM: CPT

## 2022-04-08 PROCEDURE — 72125 CT NECK SPINE W/O DYE: CPT | Mod: 26,MA

## 2022-04-08 PROCEDURE — 99223 1ST HOSP IP/OBS HIGH 75: CPT

## 2022-04-08 PROCEDURE — 72170 X-RAY EXAM OF PELVIS: CPT | Mod: 26

## 2022-04-08 PROCEDURE — 74177 CT ABD & PELVIS W/CONTRAST: CPT | Mod: 26,MA

## 2022-04-08 PROCEDURE — 71260 CT THORAX DX C+: CPT | Mod: 26,MA

## 2022-04-08 PROCEDURE — 70450 CT HEAD/BRAIN W/O DYE: CPT | Mod: 26,MA

## 2022-04-08 PROCEDURE — 93010 ELECTROCARDIOGRAM REPORT: CPT

## 2022-04-08 PROCEDURE — 71045 X-RAY EXAM CHEST 1 VIEW: CPT | Mod: 26

## 2022-04-08 RX ORDER — ACETAMINOPHEN 500 MG
650 TABLET ORAL EVERY 6 HOURS
Refills: 0 | Status: DISCONTINUED | OUTPATIENT
Start: 2022-04-08 | End: 2022-04-12

## 2022-04-08 RX ORDER — SODIUM CHLORIDE 9 MG/ML
1000 INJECTION, SOLUTION INTRAVENOUS ONCE
Refills: 0 | Status: COMPLETED | OUTPATIENT
Start: 2022-04-08 | End: 2022-04-08

## 2022-04-08 RX ORDER — AZITHROMYCIN 500 MG/1
500 TABLET, FILM COATED ORAL ONCE
Refills: 0 | Status: COMPLETED | OUTPATIENT
Start: 2022-04-08 | End: 2022-04-08

## 2022-04-08 RX ORDER — AZTREONAM 2 G
2000 VIAL (EA) INJECTION ONCE
Refills: 0 | Status: COMPLETED | OUTPATIENT
Start: 2022-04-08 | End: 2022-04-08

## 2022-04-08 RX ORDER — LANOLIN ALCOHOL/MO/W.PET/CERES
3 CREAM (GRAM) TOPICAL AT BEDTIME
Refills: 0 | Status: DISCONTINUED | OUTPATIENT
Start: 2022-04-08 | End: 2022-04-12

## 2022-04-08 RX ADMIN — Medication 100 MILLIGRAM(S): at 22:38

## 2022-04-08 RX ADMIN — AZITHROMYCIN 255 MILLIGRAM(S): 500 TABLET, FILM COATED ORAL at 23:11

## 2022-04-08 RX ADMIN — SODIUM CHLORIDE 1000 MILLILITER(S): 9 INJECTION, SOLUTION INTRAVENOUS at 23:11

## 2022-04-08 RX ADMIN — SODIUM CHLORIDE 1000 MILLILITER(S): 9 INJECTION, SOLUTION INTRAVENOUS at 19:52

## 2022-04-08 NOTE — ED ADULT NURSE NOTE - OBJECTIVE STATEMENT
Patient presents with son s/p fall yesterday reported at 1900.  Pt states her granddaughter was playing by her and her leg gave out causing her fall.  No LOC, vomiting or nausea. Son reports that when she woke up this morning she was not acting her normal baseline. Patient presents with son s/p fall yesterday reported at 1900.  Pt states her granddaughter was playing by her and her leg gave out causing her fall.  No LOC, vomiting or nausea. Son reports that when she woke up this morning she was not acting her normal baseline. Pt reports pain in her head and B/L knees.

## 2022-04-08 NOTE — ED PROVIDER NOTE - OBJECTIVE STATEMENT
The pt is a 66y F w/ hx of HTN, HLD, DM, CKD, OA chronic knee pain on dilaudid, anxiety on Xanax and Valium PRN presenting s/p mechanical fall yesterday. Pt states she was walking and slipped, fell onto BL knees and then forward and hit her head. Pt's son witnessed event, states no LOC and pt is not on AC. Pt went to bed at baseline mental status, then today has been more somnolent than normal. In triage, hypotensive 80s/50s. Pt remembers all events regarding fall. Endorses thoracic back pain. Denies headache, chest pain, SOB, N/V, abdominal pain.

## 2022-04-08 NOTE — ED ADULT NURSE NOTE - INTERVENTIONS DEFINITIONS
[place] to call system/Instruct patient to call for assistance/Provide visual cue, wrist band, yellow gown, etc./Monitor for mental status changes and reorient to person, place, and time

## 2022-04-08 NOTE — ED ADULT NURSE NOTE - NSICDXPASTMEDICALHX_GEN_ALL_CORE_FT
PAST MEDICAL HISTORY:  Hypertension, unspecified type     Neuropathy     Other osteoporosis     Type 2 diabetes mellitus without complication, unspecified long term insulin use status

## 2022-04-08 NOTE — ED PROVIDER NOTE - PROGRESS NOTE DETAILS
ED Attending BINDU Manrique  BUN / creatinine 44/3.3, previous in October 2021 was 28/1.23. ATTENDING NOTE: I personally evaluated the patient. I reviewed the Resident’s or Physician Assistant’s note (as assigned above), and agree with the findings and plan except as documented in my note.   67 y/o F with PMH of HTN, HLD, DM, CKD, OA chronic knee pain on dilaudid, anxiety on Xanax and Valium presents s/p mechanical fall yesterday. Pt reports she was walking up the stairs when trying to go over one step she slipped and fell forward hitting b/l knees and her head. Pt then went to bed and was behaving at baseline, but when she woke up she was more somnolent and weak appearing. No loc, not on any blood thinners, recalls all events prior to and after fall. denies fever, chills, n/v, cp, sob, pleuritic chest pain, palpitations, diaphoresis, cough, chest wall/rib pain, blurry vision/visual changes, neck pain/stiffness, back pain, abd pain,, hip pain, weakness, numbness/tingling, HA/LH/dizziness, lacerations, abrasions, ecchymoses, or swelling. GCS15.  VS: I have reviewed the initial vital signs. Constitutional: WDWN weak and tired appearing, otherwise in NAD. HEAD: No signs of basilar skull fracture. Skin:: No rash. No lacerations, abrasions, ecchymoses or swelling. EYES: No periorbital swelling/ecchymosis. PERRL, EOMI. No nystagmus. ENT: DMM. No rhinorrhea/otorrhea. No septal hematoma. No mastoid ecchymosis. NECK: Supple, non-tender, no spinous tenderness to neck. No palpable shelves or step-offs. BACK: No spinous tenderness. No palpable shelves or step-offs. Cardio: RRR, radial pulses 2/4 b/l. No pain to palpation to chest wall. Resp: BS present b/l, ctabl, no wheezing or crackles, good air exchange, good resp effort and excursion, no accessory muscle use, no stridor. No pain to palpation to ribs b/l. No crepitus. GI: BS present throughout all 4 quadrants, soft, nd, nt no rebound or guarding, no cvat. MSK: FROM, no edema, knees NT b/l, no hip pain to palpation. No short leg. No internal or external rotation of LE. Neurologic: GCS 15. AAOx3, motor 5/5 and sensation intact throughout UE and lE, CN II-XII intact, No facial droop or slurring of speech. (-) Pronator (-) Romberg. No dysmteria w. ftn or rapid alternating fine movements. No focal deficits.  Plan for EKG, labs, urine, imaging, reassess. ATTENDING NOTE: I personally evaluated the patient. I reviewed the Resident’s or Physician Assistant’s note (as assigned above), and agree with the findings and plan except as documented in my note.   65 y/o F with PMH of HTN, HLD, DM, CKD, OA chronic knee pain on dilaudid, anxiety on Xanax and Valium presents s/p mechanical fall yesterday. Per son who witnessed this pt was walking up the stairs when trying to go over one step she slipped and fell forward hitting b/l knees and front of head head. Pt then went to bed and was behaving at baseline, but when she woke up she was more somnolent and weak appearing. No loc, not on any blood thinners, pt provided similar story but is not a great historian at this time, with son reporting this is not her baseline. pt denies fever, chills, n/v, cp, sob, pleuritic chest pain, palpitations, diaphoresis, cough, chest wall/rib pain, blurry vision/visual changes, neck pain/stiffness, back pain, abd pain,, hip pain, weakness, numbness/tingling, HA/LH/dizziness, lacerations, abrasions, ecchymoses, or swelling. GCS15.  VS: I have reviewed the initial vital signs. Constitutional: weak and tired appearing, otherwise in NAD. HEAD: No signs of basilar skull fracture. Skin:: No rash. No lacerations, abrasions, ecchymoses or swelling. EYES: No periorbital swelling/ecchymosis. PERRL, EOMI. No nystagmus. ENT: DMM. No rhinorrhea/otorrhea. No septal hematoma. No mastoid ecchymosis. NECK: Supple, non-tender, no spinous tenderness to neck. No palpable shelves or step-offs. BACK: No spinous tenderness. No palpable shelves or step-offs. Cardio: RRR, radial pulses 2/4 b/l. No pain to palpation to chest wall. Resp: BS present b/l, ctabl, no wheezing or crackles, no accessory muscle use, no stridor. No pain to palpation to ribs b/l. No crepitus. GI: BS present throughout all 4 quadrants, soft, nd, nt no rebound or guarding, no cvat. MSK: FROM, no edema, knees NT b/l, no hip pain to palpation. No short leg. No internal or external rotation of LE. Neurologic: GCS 15. Awake and alert following all commands, motor 5/5 and sensation intact throughout UE and lE, CN II-XII intact, No facial droop or slurring of speech. (-) Pronator (. No focal deficits.  Plan for EKG, labs, urine, imaging, reassess.

## 2022-04-08 NOTE — ED PROVIDER NOTE - PHYSICAL EXAMINATION
Vital Signs: I have reviewed the initial vital signs.  Constitutional: well-nourished, appears stated age, no acute distress  Cardiovascular: regular rate, regular rhythm, well-perfused extremities  Respiratory: unlabored respiratory effort, clear to auscultation bilaterally  Gastrointestinal: soft, non-tender abdomen  Musculoskeletal: supple neck, no lower extremity edema. (+) Thoracic midline spinal tenderness. No cervical, lumbar midline or paraspinal tenderness. Clavicles intact. No chest wall tenderness. Pelvis stable, non-tender. BL knees without abrasions, tenderness.   Integumentary: warm, dry, no rash  Neurologic: awake, alert, extremities’ motor and sensory functions grossly intact  Psychiatric: appropriate mood, appropriate affect

## 2022-04-08 NOTE — ED ADULT TRIAGE NOTE - CHIEF COMPLAINT QUOTE
Pt s/p fall yesterday at 1900. "+" head injury, no LOC, no anticoagulants. Pt states her knees gave off. As per son, Pt is not herself since she woke up. PT is A,A,Ox4, neurologically intact

## 2022-04-08 NOTE — ED PROVIDER NOTE - CARE PLAN
1 Principal Discharge DX:	Fall  Secondary Diagnosis:	Weakness  Secondary Diagnosis:	KAREY (acute kidney injury)  Secondary Diagnosis:	Opacity of lung on imaging study  Secondary Diagnosis:	Elevated creatine kinase

## 2022-04-08 NOTE — ED PROVIDER NOTE - NS ED ROS FT
Constitutional: (-) fever  Eyes/ENT: (-) blurry vision, (-) epistaxis  Cardiovascular: (-) chest pain, (-) syncope  Respiratory: (-) cough, (-) shortness of breath  Gastrointestinal: (-) vomiting, (-) diarrhea  Musculoskeletal: (-) neck pain, (+) thoracic back pain, (-) joint pain  Integumentary: (-) rash, (-) edema  Neurological: (-) headache, (-) altered mental status  Psychiatric: (-) hallucinations  Allergic/Immunologic: (-) pruritus

## 2022-04-08 NOTE — ED PROVIDER NOTE - ST/T WAVE
Spoke with [place] representative Irvin Jackson Per Fortunato Brunner, colonoscopy is not required the prior authorization. Ref. No is [id number].   Electronically signed by Naga Nunez on 3/3/2020 at 3:56 PM No JESSICA

## 2022-04-09 LAB
A1C WITH ESTIMATED AVERAGE GLUCOSE RESULT: 5.7 % — HIGH (ref 4–5.6)
ALBUMIN SERPL ELPH-MCNC: 4.1 G/DL — SIGNIFICANT CHANGE UP (ref 3.5–5.2)
ALP SERPL-CCNC: 47 U/L — SIGNIFICANT CHANGE UP (ref 30–115)
ALT FLD-CCNC: 10 U/L — SIGNIFICANT CHANGE UP (ref 0–41)
ANION GAP SERPL CALC-SCNC: 12 MMOL/L — SIGNIFICANT CHANGE UP (ref 7–14)
APPEARANCE UR: CLEAR — SIGNIFICANT CHANGE UP
AST SERPL-CCNC: 25 U/L — SIGNIFICANT CHANGE UP (ref 0–41)
BACTERIA # UR AUTO: NEGATIVE — SIGNIFICANT CHANGE UP
BASOPHILS # BLD AUTO: 0.01 K/UL — SIGNIFICANT CHANGE UP (ref 0–0.2)
BASOPHILS NFR BLD AUTO: 0.2 % — SIGNIFICANT CHANGE UP (ref 0–1)
BILIRUB SERPL-MCNC: 0.3 MG/DL — SIGNIFICANT CHANGE UP (ref 0.2–1.2)
BILIRUB UR-MCNC: NEGATIVE — SIGNIFICANT CHANGE UP
BUN SERPL-MCNC: 38 MG/DL — HIGH (ref 10–20)
CALCIUM SERPL-MCNC: 9.6 MG/DL — SIGNIFICANT CHANGE UP (ref 8.5–10.1)
CHLORIDE SERPL-SCNC: 103 MMOL/L — SIGNIFICANT CHANGE UP (ref 98–110)
CHOLEST SERPL-MCNC: 139 MG/DL — SIGNIFICANT CHANGE UP
CK SERPL-CCNC: 660 U/L — HIGH (ref 0–225)
CO2 SERPL-SCNC: 25 MMOL/L — SIGNIFICANT CHANGE UP (ref 17–32)
COLOR SPEC: SIGNIFICANT CHANGE UP
COMMENT - URINE: SIGNIFICANT CHANGE UP
CREAT SERPL-MCNC: 2.9 MG/DL — HIGH (ref 0.7–1.5)
DIFF PNL FLD: ABNORMAL
EGFR: 17 ML/MIN/1.73M2 — LOW
EOSINOPHIL # BLD AUTO: 0.15 K/UL — SIGNIFICANT CHANGE UP (ref 0–0.7)
EOSINOPHIL NFR BLD AUTO: 3.6 % — SIGNIFICANT CHANGE UP (ref 0–8)
EPI CELLS # UR: 5 /HPF — SIGNIFICANT CHANGE UP (ref 0–5)
ESTIMATED AVERAGE GLUCOSE: 117 MG/DL — HIGH (ref 68–114)
GLUCOSE BLDC GLUCOMTR-MCNC: 113 MG/DL — HIGH (ref 70–99)
GLUCOSE BLDC GLUCOMTR-MCNC: 120 MG/DL — HIGH (ref 70–99)
GLUCOSE BLDC GLUCOMTR-MCNC: 121 MG/DL — HIGH (ref 70–99)
GLUCOSE BLDC GLUCOMTR-MCNC: 90 MG/DL — SIGNIFICANT CHANGE UP (ref 70–99)
GLUCOSE SERPL-MCNC: 99 MG/DL — SIGNIFICANT CHANGE UP (ref 70–99)
GLUCOSE UR QL: NEGATIVE — SIGNIFICANT CHANGE UP
HCT VFR BLD CALC: 33.5 % — LOW (ref 37–47)
HCV AB S/CO SERPL IA: 0.04 COI — SIGNIFICANT CHANGE UP
HCV AB SERPL-IMP: SIGNIFICANT CHANGE UP
HDLC SERPL-MCNC: 42 MG/DL — LOW
HGB BLD-MCNC: 10.9 G/DL — LOW (ref 12–16)
HYALINE CASTS # UR AUTO: 2 /LPF — SIGNIFICANT CHANGE UP (ref 0–7)
IMM GRANULOCYTES NFR BLD AUTO: 0.2 % — SIGNIFICANT CHANGE UP (ref 0.1–0.3)
KETONES UR-MCNC: NEGATIVE — SIGNIFICANT CHANGE UP
LEUKOCYTE ESTERASE UR-ACNC: NEGATIVE — SIGNIFICANT CHANGE UP
LIPID PNL WITH DIRECT LDL SERPL: 67 MG/DL — SIGNIFICANT CHANGE UP
LYMPHOCYTES # BLD AUTO: 1.17 K/UL — LOW (ref 1.2–3.4)
LYMPHOCYTES # BLD AUTO: 27.8 % — SIGNIFICANT CHANGE UP (ref 20.5–51.1)
MAGNESIUM SERPL-MCNC: 1.9 MG/DL — SIGNIFICANT CHANGE UP (ref 1.8–2.4)
MCHC RBC-ENTMCNC: 28.7 PG — SIGNIFICANT CHANGE UP (ref 27–31)
MCHC RBC-ENTMCNC: 32.5 G/DL — SIGNIFICANT CHANGE UP (ref 32–37)
MCV RBC AUTO: 88.2 FL — SIGNIFICANT CHANGE UP (ref 81–99)
MONOCYTES # BLD AUTO: 0.64 K/UL — HIGH (ref 0.1–0.6)
MONOCYTES NFR BLD AUTO: 15.2 % — HIGH (ref 1.7–9.3)
NEUTROPHILS # BLD AUTO: 2.23 K/UL — SIGNIFICANT CHANGE UP (ref 1.4–6.5)
NEUTROPHILS NFR BLD AUTO: 53 % — SIGNIFICANT CHANGE UP (ref 42.2–75.2)
NITRITE UR-MCNC: NEGATIVE — SIGNIFICANT CHANGE UP
NON HDL CHOLESTEROL: 97 MG/DL — SIGNIFICANT CHANGE UP
NRBC # BLD: 0 /100 WBCS — SIGNIFICANT CHANGE UP (ref 0–0)
PH UR: 5.5 — SIGNIFICANT CHANGE UP (ref 5–8)
PHOSPHATE SERPL-MCNC: 4.2 MG/DL — SIGNIFICANT CHANGE UP (ref 2.1–4.9)
PLATELET # BLD AUTO: 125 K/UL — LOW (ref 130–400)
POTASSIUM SERPL-MCNC: 4.1 MMOL/L — SIGNIFICANT CHANGE UP (ref 3.5–5)
POTASSIUM SERPL-SCNC: 4.1 MMOL/L — SIGNIFICANT CHANGE UP (ref 3.5–5)
PROT SERPL-MCNC: 6.3 G/DL — SIGNIFICANT CHANGE UP (ref 6–8)
PROT UR-MCNC: ABNORMAL
RBC # BLD: 3.8 M/UL — LOW (ref 4.2–5.4)
RBC # FLD: 14 % — SIGNIFICANT CHANGE UP (ref 11.5–14.5)
RBC CASTS # UR COMP ASSIST: 8 /HPF — HIGH (ref 0–4)
SARS-COV-2 RNA SPEC QL NAA+PROBE: SIGNIFICANT CHANGE UP
SODIUM SERPL-SCNC: 140 MMOL/L — SIGNIFICANT CHANGE UP (ref 135–146)
SP GR SPEC: 1.02 — SIGNIFICANT CHANGE UP (ref 1.01–1.03)
TRIGL SERPL-MCNC: 125 MG/DL — SIGNIFICANT CHANGE UP
UROBILINOGEN FLD QL: SIGNIFICANT CHANGE UP
WBC # BLD: 4.21 K/UL — LOW (ref 4.8–10.8)
WBC # FLD AUTO: 4.21 K/UL — LOW (ref 4.8–10.8)
WBC UR QL: 4 /HPF — SIGNIFICANT CHANGE UP (ref 0–5)

## 2022-04-09 PROCEDURE — 99233 SBSQ HOSP IP/OBS HIGH 50: CPT

## 2022-04-09 RX ORDER — HEPARIN SODIUM 5000 [USP'U]/ML
5000 INJECTION INTRAVENOUS; SUBCUTANEOUS EVERY 12 HOURS
Refills: 0 | Status: DISCONTINUED | OUTPATIENT
Start: 2022-04-09 | End: 2022-04-12

## 2022-04-09 RX ORDER — CEFTRIAXONE 500 MG/1
1000 INJECTION, POWDER, FOR SOLUTION INTRAMUSCULAR; INTRAVENOUS EVERY 24 HOURS
Refills: 0 | Status: DISCONTINUED | OUTPATIENT
Start: 2022-04-09 | End: 2022-04-09

## 2022-04-09 RX ORDER — SODIUM CHLORIDE 9 MG/ML
1000 INJECTION, SOLUTION INTRAVENOUS
Refills: 0 | Status: DISCONTINUED | OUTPATIENT
Start: 2022-04-09 | End: 2022-04-12

## 2022-04-09 RX ORDER — DIAZEPAM 5 MG
5 TABLET ORAL EVERY 6 HOURS
Refills: 0 | Status: DISCONTINUED | OUTPATIENT
Start: 2022-04-09 | End: 2022-04-12

## 2022-04-09 RX ORDER — CEFTRIAXONE 500 MG/1
1000 INJECTION, POWDER, FOR SOLUTION INTRAMUSCULAR; INTRAVENOUS EVERY 24 HOURS
Refills: 0 | Status: DISCONTINUED | OUTPATIENT
Start: 2022-04-09 | End: 2022-04-10

## 2022-04-09 RX ORDER — POLYETHYLENE GLYCOL 3350 17 G/17G
17 POWDER, FOR SOLUTION ORAL AT BEDTIME
Refills: 0 | Status: DISCONTINUED | OUTPATIENT
Start: 2022-04-09 | End: 2022-04-10

## 2022-04-09 RX ORDER — PANTOPRAZOLE SODIUM 20 MG/1
40 TABLET, DELAYED RELEASE ORAL
Refills: 0 | Status: DISCONTINUED | OUTPATIENT
Start: 2022-04-09 | End: 2022-04-12

## 2022-04-09 RX ORDER — ROSUVASTATIN CALCIUM 5 MG/1
1 TABLET ORAL
Qty: 0 | Refills: 0 | DISCHARGE

## 2022-04-09 RX ORDER — ALPRAZOLAM 0.25 MG
2 TABLET ORAL
Qty: 0 | Refills: 0 | DISCHARGE

## 2022-04-09 RX ORDER — ATORVASTATIN CALCIUM 80 MG/1
80 TABLET, FILM COATED ORAL AT BEDTIME
Refills: 0 | Status: DISCONTINUED | OUTPATIENT
Start: 2022-04-09 | End: 2022-04-12

## 2022-04-09 RX ORDER — HYDROMORPHONE HYDROCHLORIDE 2 MG/ML
4 INJECTION INTRAMUSCULAR; INTRAVENOUS; SUBCUTANEOUS EVERY 6 HOURS
Refills: 0 | Status: DISCONTINUED | OUTPATIENT
Start: 2022-04-09 | End: 2022-04-12

## 2022-04-09 RX ORDER — NIFEDIPINE 30 MG
1 TABLET, EXTENDED RELEASE 24 HR ORAL
Qty: 0 | Refills: 0 | DISCHARGE

## 2022-04-09 RX ORDER — PIOGLITAZONE HYDROCHLORIDE 15 MG/1
1 TABLET ORAL
Qty: 0 | Refills: 0 | DISCHARGE

## 2022-04-09 RX ORDER — SENNA PLUS 8.6 MG/1
2 TABLET ORAL AT BEDTIME
Refills: 0 | Status: DISCONTINUED | OUTPATIENT
Start: 2022-04-09 | End: 2022-04-12

## 2022-04-09 RX ORDER — DIAZEPAM 5 MG
1 TABLET ORAL
Qty: 0 | Refills: 0 | DISCHARGE

## 2022-04-09 RX ORDER — HYDROMORPHONE HYDROCHLORIDE 2 MG/ML
1 INJECTION INTRAMUSCULAR; INTRAVENOUS; SUBCUTANEOUS
Qty: 0 | Refills: 0 | DISCHARGE

## 2022-04-09 RX ADMIN — SENNA PLUS 2 TABLET(S): 8.6 TABLET ORAL at 21:33

## 2022-04-09 RX ADMIN — Medication 650 MILLIGRAM(S): at 09:45

## 2022-04-09 RX ADMIN — POLYETHYLENE GLYCOL 3350 17 GRAM(S): 17 POWDER, FOR SOLUTION ORAL at 21:33

## 2022-04-09 RX ADMIN — HEPARIN SODIUM 5000 UNIT(S): 5000 INJECTION INTRAVENOUS; SUBCUTANEOUS at 17:37

## 2022-04-09 RX ADMIN — CEFTRIAXONE 100 MILLIGRAM(S): 500 INJECTION, POWDER, FOR SOLUTION INTRAMUSCULAR; INTRAVENOUS at 06:12

## 2022-04-09 RX ADMIN — SODIUM CHLORIDE 75 MILLILITER(S): 9 INJECTION, SOLUTION INTRAVENOUS at 17:37

## 2022-04-09 RX ADMIN — SODIUM CHLORIDE 75 MILLILITER(S): 9 INJECTION, SOLUTION INTRAVENOUS at 02:57

## 2022-04-09 RX ADMIN — Medication 650 MILLIGRAM(S): at 09:25

## 2022-04-09 RX ADMIN — ATORVASTATIN CALCIUM 80 MILLIGRAM(S): 80 TABLET, FILM COATED ORAL at 21:33

## 2022-04-09 RX ADMIN — PANTOPRAZOLE SODIUM 40 MILLIGRAM(S): 20 TABLET, DELAYED RELEASE ORAL at 06:13

## 2022-04-09 RX ADMIN — HEPARIN SODIUM 5000 UNIT(S): 5000 INJECTION INTRAVENOUS; SUBCUTANEOUS at 06:13

## 2022-04-09 RX ADMIN — SODIUM CHLORIDE 75 MILLILITER(S): 9 INJECTION, SOLUTION INTRAVENOUS at 12:06

## 2022-04-09 NOTE — OCCUPATIONAL THERAPY INITIAL EVALUATION ADULT - GENERAL OBSERVATIONS, REHAB EVAL
OT eval: 8:55- OT eval: 8:55-9:20 Pt received semi raymundo in bed in NAD. Pt presents with fatigue and min confusion but agreeable to OT eval.

## 2022-04-09 NOTE — PHYSICAL THERAPY INITIAL EVALUATION ADULT - SPECIFY REASON(S)
pt encountered in bed, very lethargic at this time, not able to participate in PT.  Will hold PT for now. PT to follow

## 2022-04-09 NOTE — OCCUPATIONAL THERAPY INITIAL EVALUATION ADULT - PERTINENT HX OF CURRENT PROBLEM, REHAB EVAL
67 yo female pt  w/ hx of HTN, HLD, DM, CKD, OA chronic knee pain, anxiety, presenting s/p mechanical fall yesterday. Pt states she tripped over her granddaughter's toys while walking falling onto her BL knees and then forward hitting her head. Pt remembers all event regarding the fall. Pt later went to bed with no complaints, then woke up this morning with headache and confusion. Found to be hypotensive with positive UA.

## 2022-04-09 NOTE — OCCUPATIONAL THERAPY INITIAL EVALUATION ADULT - LEVEL OF CONSCIOUSNESS, OT EVAL
with min confusion at times. To be noted pt observed falling in and out of sleep throughout OT eval/alert

## 2022-04-09 NOTE — OCCUPATIONAL THERAPY INITIAL EVALUATION ADULT - RANGE OF MOTION EXAMINATION, UPPER EXTREMITY
mario shoulder AROM flexion 3/4 range (possibly 2/2 fatigue)/bilateral UE Active ROM was WFL  (within functional limits)

## 2022-04-09 NOTE — PHYSICAL THERAPY INITIAL EVALUATION ADULT - PERTINENT HX OF CURRENT PROBLEM, REHAB EVAL
67 yo female pt  w/ hx of HTN, HLD, DM, CKD, OA chronic knee pain, anxiety, presenting with AMS since this morning sp mechanical fall yesterday. Found to be hypotensive with positive UA.

## 2022-04-09 NOTE — H&P ADULT - ATTENDING COMMENTS
67 YO F with a PMH of HTN, HLD, DM2, CKD3, OA, and anxiety who was BIBEMS for eval of pt being more somnolent than usual s/p head trauma after mechanical trip and fall over her granddaughters toys yesterday in the PM. + HT, - AC, and - LOC. Remembers entire event. No preceding symptoms of CP, SOB, dizziness, focal weakness, or headaches. No seizure-like activity. ROS negative, except as stated above    In the ED, CTH/C-spine showed no acute process. CT-CAP w/ IV contrast showed no acute process. KAREY, elevated CK, and hypotension present. UA positive. Started on IV ABXs (Aztreonam/Azithro) and IVFs (LR) in the ED.     FMHx: Reviewed, not relevant    Physical exam shows pt in NAD. VSS, afebrile, not hypoxic on RA. A&Ox3. Head is atraumatic. Neuro shows a non-focal exam. Motor strength/sensation is intact. CTA B/L with no W/C/R. RRR, no M/G/R. ABD is soft and non-tender, normoactive BSs. Extremities w/o evidence of trauma. No rashes. Labs and radiology as above.     Head trauma s/p mechanical trip and fall. TSH. Serial CK levels. Check orthostatics. PT. Fall precautions.     Elevated CK level. IVFs. Monitor urinary out-put (Maintain 1-1.5 cc/kg/hr). If KAREY and oliguria develop, alkalinize the urine with bicarb infusion or addition of bicarb to the IVFs. Trend CK/LFTs levels. Serial BMPs.     KAREY on CKD3. Send UA, urine lytes, urine pr:cr ratio, and trend BMP. IVFs. Monitor urinary out-put. Hold nephrotoxic agents.     Urinary tract infection; no sepsis present on admission. FU cultures. IVFs. PRN pain meds. IV ABXs (Ceftriaxone).     Normocytic anemia, unknown baseline. Pt denies bleeding symptoms. Replace as necessary.     HX of HTN, HLD, DM2, CKD3, OA, and anxiety. Restart home meds, except as stated above. DVT PPX. Inform PCP of pt's admission to hospital. My note supersedes the residents note.     Date seen by [email or web address]

## 2022-04-09 NOTE — OCCUPATIONAL THERAPY INITIAL EVALUATION ADULT - LEVEL OF INDEPENDENCE: SIT/SUPINE, REHAB EVAL
pt declined to perform at this time 2/2 c/o 10/10 pain in BLE, head and back. CARLTON Camara notified.

## 2022-04-09 NOTE — H&P ADULT - NSHPPHYSICALEXAM_GEN_ALL_CORE
T(C): 36.3 (04-08-22 @ 19:13), Max: 36.3 (04-08-22 @ 19:13)  HR: 69 (04-09-22 @ 01:09) (69 - 72)  BP: 94/54 (04-09-22 @ 01:09) (83/38 - 101/56)  RR: 17 (04-09-22 @ 01:09) (17 - 19)  SpO2: 97% (04-09-22 @ 01:09) (96% - 97%)    CONSTITUTIONAL: no apparent distress    EYES: PERRLA and symmetric, EOMI, No conjunctival or scleral injection, non-icteric    RESPIRATORY: No respiratory distress, no use of accessory muscles; CTA b/l, no wheezes, rales or rhonchi    CARDIOVASCULAR: RRRR, +S1S2, No LLE     GASTROINTESTINAL: Soft, non tender, non distended, no rebound, no guarding    SKIN: No rashes or ulcers noted; no subcutaneous nodules or induration palpable    PSYCHIATRIC: Appropriate insight/judgment; A+O x 3, mood and affect appropriate, recent/remote memory intact

## 2022-04-09 NOTE — OCCUPATIONAL THERAPY INITIAL EVALUATION ADULT - TRANSFER TRAINING, PT EVAL
Pt will transfer from bed<>chair and to toilet with no more than mod assist by discharge to work towards returning to PLOF.

## 2022-04-09 NOTE — PHYSICAL THERAPY INITIAL EVALUATION ADULT - GENERAL OBSERVATIONS, REHAB EVAL
14:30-15:00 pt encountered in bed, lethargic but able to arouse, +IV.  pt was able to transfer OOB and ambulate a short distance with RW and PT assistance, limited by fatigue.  Continue PT as tolerated.

## 2022-04-09 NOTE — PATIENT PROFILE ADULT - FALL HARM RISK - HARM RISK INTERVENTIONS
Assistance with ambulation/Assistance OOB with selected safe patient handling equipment/Communicate Risk of Fall with Harm to all staff/Discuss with provider need for PT consult/Monitor gait and stability/Reinforce activity limits and safety measures with patient and family/Tailored Fall Risk Interventions/Visual Cue: Yellow wristband and red socks/Bed in lowest position, wheels locked, appropriate side rails in place/Call bell, personal items and telephone in reach/Instruct patient to call for assistance before getting out of bed or chair/Non-slip footwear when patient is out of bed/[place] to call system/Physically safe environment - no spills, clutter or unnecessary equipment/Purposeful Proactive Rounding/Room/bathroom lighting operational, light cord in reach

## 2022-04-09 NOTE — H&P ADULT - ASSESSMENT
67 yo female pt  w/ hx of HTN, HLD, DM, CKD, OA chronic knee pain, anxiety, presenting with AMS since this morning sp mechanical fall yesterday. Found to be hypotensive with positive UA.     #AMS likely 2/2 to   #head concussion s/p mechanical fall  #symptomatic UTI   - s/p mechanical fall with head trauma 1 day PTP   - off AC   - trauma wup negative   - PT/OT C/S   - UA: WBC 22, mod LE  - afebrile, no white count  - hypotensive, pt endorses that she took her BP medications this morning   - sp 2L LR in ED  - c/w LR @ 75cc/hr   - FU BCx, UCx  - Abx:     #KAREY on CKD   #likely prerenal in the setting of decreased PO intake   - baseline Cr 1.2-1.4 in 2021  - Cr 3.3 on admission   - no hydronephrosis on CTAP  - sp 2L LR in ED  - c/w LR @75cc/hr   - FU Cr in am   - if no improvement, obtain Rachel, Ucr  - avoid nephrotoxic drugs     # bl knee Osteoarthritis  # sp multiple back surgeries   - follow with rheum (Dr Malagon) as OP   - receives steroid injections every couple of months   - on dilaudid 16mg SR twice daily (as per the rheum's note)--> please check ISTOP in the morning   - started on dilaudid 4mg PO q6h PRN severe pain     #Anxiety   - pt on xanax and valium as per outpt med review   - please obtain medrec from pharmacy in am   - started on valium 5mg PO q6h PRN     #DM with neuropathy   - on pioglitazone as per outpt medrec (check w pharmacy in am)   - monitor FS if >180 start insulin   - on lyrica at home --> adjusted acc to CrCl 75mg OD  - FU A1c in am      #HLD   - on  rosuva 40 PO OD as per outpt medrec (check w pharmacy in am)   - started on atorvastatin 80 PO OD  - lipid panel in am     # DIET: DASH   # DVT PPX: heparin SQ  # GI PPx: protonix  # Activity: IAT     please obtain med rec from [facility] pharmacy in am      65 yo female pt  w/ hx of HTN, HLD, DM, CKD, OA chronic knee pain, anxiety, presenting with AMS since this morning sp mechanical fall yesterday. Found to be hypotensive with positive UA.     #AMS likely 2/2 to   #head concussion s/p mechanical fall  #symptomatic UTI   - s/p mechanical fall with head trauma 1 day PTP   - off AC  - , FU repeat in am   - trauma wup negative   - PT/OT C/S   - UA: WBC 22, mod LE  - afebrile, no white count  - hypotensive, pt endorses that she took her BP medications this morning   - sp 2L LR in ED  - c/w LR @ 75cc/hr   - FU BCx, UCx  - Abx: rocephin 1gm IVPB OD     #KAREY on CKD   #likely prerenal in the setting of decreased PO intake   - baseline Cr 1.2-1.4 in 2021  - Cr 3.3 on admission   - no hydronephrosis on CTAP  - sp 2L LR in ED  - c/w LR @75cc/hr   - FU Cr in am   - if no improvement, obtain Rachel, Ucr  - avoid nephrotoxic drugs     # bl knee Osteoarthritis  # sp multiple back surgeries   - follow with rheum (Dr Malagon) as OP   - receives steroid injections every couple of months   - on dilaudid 16mg SR twice daily (as per the rheum's note)--> please check ISTOP in the morning   - started on dilaudid 4mg PO q6h PRN severe pain     #Anxiety   - pt on xanax and valium as per outpt med review   - please obtain medrec from pharmacy in am   - started on valium 5mg PO q6h PRN     #DM with neuropathy   - on pioglitazone as per outpt medrec (check w pharmacy in am)   - monitor FS if >180 start insulin   - on lyrica at home --> adjusted acc to CrCl 75mg OD  - FU A1c in am      #HLD   - on  rosuva 40 PO OD as per outpt medrec (check w pharmacy in am)   - started on atorvastatin 80 PO OD  - lipid panel in am     # DIET: DASH   # DVT PPX: heparin SQ  # GI PPx: protonix  # Activity: IAT     please obtain med rec [facility]

## 2022-04-09 NOTE — OCCUPATIONAL THERAPY INITIAL EVALUATION ADULT - SHORT TERM MEMORY, REHAB EVAL
Pt repeats 2/3 words and recalls 0/3 words. To be noted pt observed closing eyes and falling asleep during assessment/impaired

## 2022-04-09 NOTE — H&P ADULT - NSHPLABSRESULTS_GEN_ALL_CORE
LABS:  04-08 @ 19:38 Creatine 875 U/L<H> [0 - 225]  cret                        11.3   5.42  )-----------( 123      ( 08 Apr 2022 19:38 )             36.7     04-08    138  |  102  |  44<H>  ----------------------------<  108<H>  4.4   |  26  |  3.3<H>    Ca    9.7      08 Apr 2022 19:38    TPro  6.8  /  Alb  4.1  /  TBili  0.4  /  DBili  x   /  AST  30  /  ALT  11  /  AlkPhos  53  04-08    PT/INR - ( 08 Apr 2022 19:38 )   PT: 11.80 sec;   INR: 1.03 ratio         PTT - ( 08 Apr 2022 19:38 )  PTT:35.8 sec

## 2022-04-09 NOTE — OCCUPATIONAL THERAPY INITIAL EVALUATION ADULT - ADL RETRAINING, OT EVAL
Pt will perform UB dressing with min assist and LB dressing with mod assist by discharge to work towards returning to PLOF

## 2022-04-10 LAB
ALBUMIN SERPL ELPH-MCNC: 3.6 G/DL — SIGNIFICANT CHANGE UP (ref 3.5–5.2)
ALP SERPL-CCNC: 50 U/L — SIGNIFICANT CHANGE UP (ref 30–115)
ALT FLD-CCNC: 8 U/L — SIGNIFICANT CHANGE UP (ref 0–41)
ANION GAP SERPL CALC-SCNC: 12 MMOL/L — SIGNIFICANT CHANGE UP (ref 7–14)
AST SERPL-CCNC: 18 U/L — SIGNIFICANT CHANGE UP (ref 0–41)
BASOPHILS # BLD AUTO: 0.01 K/UL — SIGNIFICANT CHANGE UP (ref 0–0.2)
BASOPHILS NFR BLD AUTO: 0.3 % — SIGNIFICANT CHANGE UP (ref 0–1)
BILIRUB SERPL-MCNC: 0.3 MG/DL — SIGNIFICANT CHANGE UP (ref 0.2–1.2)
BUN SERPL-MCNC: 36 MG/DL — HIGH (ref 10–20)
CALCIUM SERPL-MCNC: 9.6 MG/DL — SIGNIFICANT CHANGE UP (ref 8.5–10.1)
CHLORIDE SERPL-SCNC: 105 MMOL/L — SIGNIFICANT CHANGE UP (ref 98–110)
CO2 SERPL-SCNC: 25 MMOL/L — SIGNIFICANT CHANGE UP (ref 17–32)
CREAT SERPL-MCNC: 2.7 MG/DL — HIGH (ref 0.7–1.5)
CULTURE RESULTS: SIGNIFICANT CHANGE UP
EGFR: 19 ML/MIN/1.73M2 — LOW
EOSINOPHIL # BLD AUTO: 0.09 K/UL — SIGNIFICANT CHANGE UP (ref 0–0.7)
EOSINOPHIL NFR BLD AUTO: 2.5 % — SIGNIFICANT CHANGE UP (ref 0–8)
GLUCOSE BLDC GLUCOMTR-MCNC: 105 MG/DL — HIGH (ref 70–99)
GLUCOSE BLDC GLUCOMTR-MCNC: 106 MG/DL — HIGH (ref 70–99)
GLUCOSE BLDC GLUCOMTR-MCNC: 107 MG/DL — HIGH (ref 70–99)
GLUCOSE BLDC GLUCOMTR-MCNC: 99 MG/DL — SIGNIFICANT CHANGE UP (ref 70–99)
GLUCOSE SERPL-MCNC: 102 MG/DL — HIGH (ref 70–99)
HCT VFR BLD CALC: 30.7 % — LOW (ref 37–47)
HGB BLD-MCNC: 10.1 G/DL — LOW (ref 12–16)
IMM GRANULOCYTES NFR BLD AUTO: 0.3 % — SIGNIFICANT CHANGE UP (ref 0.1–0.3)
LYMPHOCYTES # BLD AUTO: 0.73 K/UL — LOW (ref 1.2–3.4)
LYMPHOCYTES # BLD AUTO: 20.3 % — LOW (ref 20.5–51.1)
MAGNESIUM SERPL-MCNC: 1.9 MG/DL — SIGNIFICANT CHANGE UP (ref 1.8–2.4)
MCHC RBC-ENTMCNC: 28.2 PG — SIGNIFICANT CHANGE UP (ref 27–31)
MCHC RBC-ENTMCNC: 32.9 G/DL — SIGNIFICANT CHANGE UP (ref 32–37)
MCV RBC AUTO: 85.8 FL — SIGNIFICANT CHANGE UP (ref 81–99)
MONOCYTES # BLD AUTO: 0.38 K/UL — SIGNIFICANT CHANGE UP (ref 0.1–0.6)
MONOCYTES NFR BLD AUTO: 10.6 % — HIGH (ref 1.7–9.3)
NEUTROPHILS # BLD AUTO: 2.37 K/UL — SIGNIFICANT CHANGE UP (ref 1.4–6.5)
NEUTROPHILS NFR BLD AUTO: 66 % — SIGNIFICANT CHANGE UP (ref 42.2–75.2)
NRBC # BLD: 0 /100 WBCS — SIGNIFICANT CHANGE UP (ref 0–0)
PHOSPHATE SERPL-MCNC: 3.5 MG/DL — SIGNIFICANT CHANGE UP (ref 2.1–4.9)
PLATELET # BLD AUTO: 117 K/UL — LOW (ref 130–400)
POTASSIUM SERPL-MCNC: 4.5 MMOL/L — SIGNIFICANT CHANGE UP (ref 3.5–5)
POTASSIUM SERPL-SCNC: 4.5 MMOL/L — SIGNIFICANT CHANGE UP (ref 3.5–5)
PROT SERPL-MCNC: 5.8 G/DL — LOW (ref 6–8)
RBC # BLD: 3.58 M/UL — LOW (ref 4.2–5.4)
RBC # FLD: 13.6 % — SIGNIFICANT CHANGE UP (ref 11.5–14.5)
SODIUM SERPL-SCNC: 142 MMOL/L — SIGNIFICANT CHANGE UP (ref 135–146)
SPECIMEN SOURCE: SIGNIFICANT CHANGE UP
WBC # BLD: 3.59 K/UL — LOW (ref 4.8–10.8)
WBC # FLD AUTO: 3.59 K/UL — LOW (ref 4.8–10.8)

## 2022-04-10 PROCEDURE — 99232 SBSQ HOSP IP/OBS MODERATE 35: CPT

## 2022-04-10 RX ORDER — LACTULOSE 10 G/15ML
20 SOLUTION ORAL THREE TIMES A DAY
Refills: 0 | Status: DISCONTINUED | OUTPATIENT
Start: 2022-04-10 | End: 2022-04-11

## 2022-04-10 RX ADMIN — SODIUM CHLORIDE 75 MILLILITER(S): 9 INJECTION, SOLUTION INTRAVENOUS at 07:43

## 2022-04-10 RX ADMIN — ATORVASTATIN CALCIUM 80 MILLIGRAM(S): 80 TABLET, FILM COATED ORAL at 21:10

## 2022-04-10 RX ADMIN — HEPARIN SODIUM 5000 UNIT(S): 5000 INJECTION INTRAVENOUS; SUBCUTANEOUS at 17:38

## 2022-04-10 RX ADMIN — HEPARIN SODIUM 5000 UNIT(S): 5000 INJECTION INTRAVENOUS; SUBCUTANEOUS at 05:24

## 2022-04-10 RX ADMIN — Medication 200 MILLIGRAM(S): at 22:13

## 2022-04-10 RX ADMIN — PANTOPRAZOLE SODIUM 40 MILLIGRAM(S): 20 TABLET, DELAYED RELEASE ORAL at 07:43

## 2022-04-10 RX ADMIN — CEFTRIAXONE 100 MILLIGRAM(S): 500 INJECTION, POWDER, FOR SOLUTION INTRAMUSCULAR; INTRAVENOUS at 05:25

## 2022-04-10 RX ADMIN — Medication 75 MILLIGRAM(S): at 12:11

## 2022-04-10 RX ADMIN — LACTULOSE 20 GRAM(S): 10 SOLUTION ORAL at 13:26

## 2022-04-11 LAB
ALBUMIN SERPL ELPH-MCNC: 3.6 G/DL — SIGNIFICANT CHANGE UP (ref 3.5–5.2)
ALP SERPL-CCNC: 46 U/L — SIGNIFICANT CHANGE UP (ref 30–115)
ALT FLD-CCNC: 7 U/L — SIGNIFICANT CHANGE UP (ref 0–41)
ANION GAP SERPL CALC-SCNC: 11 MMOL/L — SIGNIFICANT CHANGE UP (ref 7–14)
AST SERPL-CCNC: 15 U/L — SIGNIFICANT CHANGE UP (ref 0–41)
BASOPHILS # BLD AUTO: 0.01 K/UL — SIGNIFICANT CHANGE UP (ref 0–0.2)
BASOPHILS NFR BLD AUTO: 0.2 % — SIGNIFICANT CHANGE UP (ref 0–1)
BILIRUB SERPL-MCNC: 0.5 MG/DL — SIGNIFICANT CHANGE UP (ref 0.2–1.2)
BUN SERPL-MCNC: 29 MG/DL — HIGH (ref 10–20)
CALCIUM SERPL-MCNC: 10 MG/DL — SIGNIFICANT CHANGE UP (ref 8.5–10.1)
CHLORIDE SERPL-SCNC: 110 MMOL/L — SIGNIFICANT CHANGE UP (ref 98–110)
CK SERPL-CCNC: 162 U/L — SIGNIFICANT CHANGE UP (ref 0–225)
CO2 SERPL-SCNC: 25 MMOL/L — SIGNIFICANT CHANGE UP (ref 17–32)
CREAT SERPL-MCNC: 2 MG/DL — HIGH (ref 0.7–1.5)
CULTURE RESULTS: SIGNIFICANT CHANGE UP
EGFR: 27 ML/MIN/1.73M2 — LOW
EOSINOPHIL # BLD AUTO: 0.06 K/UL — SIGNIFICANT CHANGE UP (ref 0–0.7)
EOSINOPHIL NFR BLD AUTO: 1.2 % — SIGNIFICANT CHANGE UP (ref 0–8)
GLUCOSE BLDC GLUCOMTR-MCNC: 101 MG/DL — HIGH (ref 70–99)
GLUCOSE BLDC GLUCOMTR-MCNC: 106 MG/DL — HIGH (ref 70–99)
GLUCOSE BLDC GLUCOMTR-MCNC: 91 MG/DL — SIGNIFICANT CHANGE UP (ref 70–99)
GLUCOSE SERPL-MCNC: 107 MG/DL — HIGH (ref 70–99)
HCT VFR BLD CALC: 31.9 % — LOW (ref 37–47)
HGB BLD-MCNC: 10.3 G/DL — LOW (ref 12–16)
IMM GRANULOCYTES NFR BLD AUTO: 0.2 % — SIGNIFICANT CHANGE UP (ref 0.1–0.3)
LYMPHOCYTES # BLD AUTO: 0.61 K/UL — LOW (ref 1.2–3.4)
LYMPHOCYTES # BLD AUTO: 11.9 % — LOW (ref 20.5–51.1)
MAGNESIUM SERPL-MCNC: 1.7 MG/DL — LOW (ref 1.8–2.4)
MCHC RBC-ENTMCNC: 27.8 PG — SIGNIFICANT CHANGE UP (ref 27–31)
MCHC RBC-ENTMCNC: 32.3 G/DL — SIGNIFICANT CHANGE UP (ref 32–37)
MCV RBC AUTO: 86 FL — SIGNIFICANT CHANGE UP (ref 81–99)
MONOCYTES # BLD AUTO: 0.41 K/UL — SIGNIFICANT CHANGE UP (ref 0.1–0.6)
MONOCYTES NFR BLD AUTO: 8 % — SIGNIFICANT CHANGE UP (ref 1.7–9.3)
NEUTROPHILS # BLD AUTO: 4.04 K/UL — SIGNIFICANT CHANGE UP (ref 1.4–6.5)
NEUTROPHILS NFR BLD AUTO: 78.5 % — HIGH (ref 42.2–75.2)
NRBC # BLD: 0 /100 WBCS — SIGNIFICANT CHANGE UP (ref 0–0)
PHOSPHATE SERPL-MCNC: 3 MG/DL — SIGNIFICANT CHANGE UP (ref 2.1–4.9)
PLATELET # BLD AUTO: 138 K/UL — SIGNIFICANT CHANGE UP (ref 130–400)
POTASSIUM SERPL-MCNC: 4.1 MMOL/L — SIGNIFICANT CHANGE UP (ref 3.5–5)
POTASSIUM SERPL-SCNC: 4.1 MMOL/L — SIGNIFICANT CHANGE UP (ref 3.5–5)
PROT SERPL-MCNC: 5.9 G/DL — LOW (ref 6–8)
RBC # BLD: 3.71 M/UL — LOW (ref 4.2–5.4)
RBC # FLD: 13.5 % — SIGNIFICANT CHANGE UP (ref 11.5–14.5)
SODIUM SERPL-SCNC: 146 MMOL/L — SIGNIFICANT CHANGE UP (ref 135–146)
SPECIMEN SOURCE: SIGNIFICANT CHANGE UP
WBC # BLD: 5.14 K/UL — SIGNIFICANT CHANGE UP (ref 4.8–10.8)
WBC # FLD AUTO: 5.14 K/UL — SIGNIFICANT CHANGE UP (ref 4.8–10.8)

## 2022-04-11 PROCEDURE — 99232 SBSQ HOSP IP/OBS MODERATE 35: CPT

## 2022-04-11 RX ORDER — CEFTRIAXONE 500 MG/1
1000 INJECTION, POWDER, FOR SOLUTION INTRAMUSCULAR; INTRAVENOUS EVERY 24 HOURS
Refills: 0 | Status: COMPLETED | OUTPATIENT
Start: 2022-04-11 | End: 2022-04-11

## 2022-04-11 RX ORDER — MAGNESIUM SULFATE 500 MG/ML
2 VIAL (ML) INJECTION ONCE
Refills: 0 | Status: COMPLETED | OUTPATIENT
Start: 2022-04-11 | End: 2022-04-11

## 2022-04-11 RX ORDER — NIFEDIPINE 30 MG
30 TABLET, EXTENDED RELEASE 24 HR ORAL EVERY 24 HOURS
Refills: 0 | Status: DISCONTINUED | OUTPATIENT
Start: 2022-04-11 | End: 2022-04-12

## 2022-04-11 RX ADMIN — SODIUM CHLORIDE 75 MILLILITER(S): 9 INJECTION, SOLUTION INTRAVENOUS at 07:28

## 2022-04-11 RX ADMIN — Medication 25 GRAM(S): at 12:14

## 2022-04-11 RX ADMIN — ATORVASTATIN CALCIUM 80 MILLIGRAM(S): 80 TABLET, FILM COATED ORAL at 21:42

## 2022-04-11 RX ADMIN — PANTOPRAZOLE SODIUM 40 MILLIGRAM(S): 20 TABLET, DELAYED RELEASE ORAL at 06:01

## 2022-04-11 RX ADMIN — HEPARIN SODIUM 5000 UNIT(S): 5000 INJECTION INTRAVENOUS; SUBCUTANEOUS at 17:52

## 2022-04-11 RX ADMIN — HYDROMORPHONE HYDROCHLORIDE 4 MILLIGRAM(S): 2 INJECTION INTRAMUSCULAR; INTRAVENOUS; SUBCUTANEOUS at 14:04

## 2022-04-11 RX ADMIN — CEFTRIAXONE 100 MILLIGRAM(S): 500 INJECTION, POWDER, FOR SOLUTION INTRAMUSCULAR; INTRAVENOUS at 17:52

## 2022-04-11 RX ADMIN — Medication 30 MILLIGRAM(S): at 17:51

## 2022-04-11 RX ADMIN — SODIUM CHLORIDE 75 MILLILITER(S): 9 INJECTION, SOLUTION INTRAVENOUS at 21:45

## 2022-04-11 RX ADMIN — Medication 200 MILLIGRAM(S): at 21:44

## 2022-04-11 RX ADMIN — HEPARIN SODIUM 5000 UNIT(S): 5000 INJECTION INTRAVENOUS; SUBCUTANEOUS at 06:00

## 2022-04-11 NOTE — PROGRESS NOTE ADULT - ASSESSMENT
65 yo female pt  w/ hx of HTN, HLD, DM, CKD, OA chronic knee pain, anxiety, presenting with AMS since this morning sp mechanical fall yesterday. Found to be hypotensive.     #S/p mechanical fall   - Trauma survey negative   - sp 2L LR in ED  - c/w LR @ 75cc/hr     #KAREY on CKD, pre-renal vs ATN due to hypotension   - no hydronephrosis on CTAP  - sp 2L LR in ED  - c/w LR @75cc/hr   - avoid nephrotoxic drugs   - hold antihypertensives     # Bilateral knee osteoarthritis  # sp multiple back surgeries   - follow with rheum (Dr. Miller) as OP   - receives steroid injections every couple of months   - c/w Dilaudid PRN     #Anxiety   - valium 5mg PO q6h PRN     #DM with neuropathy   - on pioglitazone as per outpt medrec   - monitor FS if >180 start insulin   - on lyrica at home --> adjusted acc to CrCl 75mg OD     #HLD   - on Crestor    - started on atorvastatin 80 PO OD       # DVT PPX: heparin SQ  # GI PPx: protonix    Pending: KAREY improvement   
67 yo female pt  w/ hx of HTN, HLD, DM, CKD, OA chronic knee pain, anxiety, presenting with AMS since this morning sp mechanical fall yesterday. Found to be hypotensive with positive UA.     #S/p mechanical fall   #Possible UTI   - Trauma survey negative   - sp 2L LR in ED  - c/w LR @ 75cc/hr   - FU BCx, UCx  - Abx: rocephin 1gm IVPB OD     #KAREY on CKD, pre-renal vs ATN due to hypotension   - no hydronephrosis on CTAP  - sp 2L LR in ED  - c/w LR @75cc/hr   - avoid nephrotoxic drugs   - hold antihypertensives     # Bilateral knee osteoarthritis  # sp multiple back surgeries   - follow with rheum (Dr Malagon) as OP   - receives steroid injections every couple of months   - c/w Dilaudid PRN     #Anxiety   - valium 5mg PO q6h PRN     #DM with neuropathy   - on pioglitazone as per outpt medrec   - monitor FS if >180 start insulin   - on lyrica at home --> adjusted acc to CrCl 75mg OD     #HLD   - on Crestor    - started on atorvastatin 80 PO OD       # DVT PPX: heparin SQ  # GI PPx: protonix    Pending: KAREY improvement   
65 yo female pt  w/ hx of HTN, HLD, DM, CKD, OA chronic knee pain, anxiety, presenting with AMS since this morning sp mechanical fall yesterday. Found to be hypotensive with positive UA.     #AMS likely 2/2 to   #head concussion s/p mechanical fall  #symptomatic UTI   - s/p mechanical fall with head trauma 1 day PTP   - off AC  -  -> 660 -> 162  - trauma wkup negative   - PT eval -> home w/ home PT  - UA: WBC 22, mod LE -> culture -> contamination  - afebrile, no white count  - hypotensive, pt endorses that she took her BP medications this morning   - sp 2L LR in ED  - c/w LR @ 75cc/hr   - FU BCx -> NGTD  - Abx: rocephin 1gm IVPB OD     #KAREY on CKD   #likely prerenal in the setting of decreased PO intake   - baseline Cr 1.2-1.4 in 2021  - Cr 3.3 on admission   - no hydronephrosis on CTAP  - sp 2L LR in ED  - c/w LR @75cc/hr   - FU Cr -> 2 04/11  - avoid nephrotoxic drugs     # bl knee Osteoarthritis  # sp multiple back surgeries   - follow with rheum (Dr Malagon) as OP   - receives steroid injections every couple of months   - on dilaudid 16mg SR twice daily (as per the rheum's note)--> please check ISTOP in the morning   - started on dilaudid 4mg PO q6h PRN severe pain     #Anxiety   - pt on xanax and valium as per outpt med review   - please obtain medrec from pharmacy in am   - started on valium 5mg PO q6h PRN     #DM with neuropathy   - on pioglitazone as per outpt medrec (check w pharmacy in am)   - monitor FS if >180 start insulin   - on lyrica at home --> adjusted acc to CrCl 75mg OD  - FU A1c -> 5.7      #HLD   - on  rosuva 40 PO OD as per outpt medrec (check w pharmacy in am)   - started on atorvastatin 80 PO OD  - lipid panel -> LDL 67 HDL 42     #MISC  DIET: DASH   DVT PPX: heparin SQ  GI PPx: protonix  Activity: IAT

## 2022-04-11 NOTE — PROGRESS NOTE ADULT - ATTENDING COMMENTS
65 yo female pt  w/ hx of HTN, HLD, DM, CKD, OA chronic knee pain, anxiety, presenting with AMS since this morning sp mechanical fall yesterday. Found to be hypotensive.     #S/p mechanical fall   - Trauma survey negative   - sp 2L LR in ED    #KAREY on CKD, pre-renal vs ATN due to hypotension   - no hydronephrosis on CTAP  - sp 2L LR in ED  - c/w LR @75cc/hr   - avoid nephrotoxic drugs   - resume nifedipine     # Bilateral knee osteoarthritis  # sp multiple back surgeries   - follow with rheum (Dr. Miller) as OP   - receives steroid injections every couple of months   - c/w Dilaudid PRN     #Anxiety   - valium 5mg PO q6h PRN     #DM with neuropathy   - on pioglitazone as per outpt medrec   - monitor FS if >180 start insulin   - on lyrica at home --> adjusted acc to CrCl 75mg OD     #HLD   - on Crestor    - started on atorvastatin 80 PO OD       # DVT PPX: heparin SQ  # GI PPx: protonix    Pending: KAREY improvement   Updated son on status

## 2022-04-11 NOTE — PROGRESS NOTE ADULT - SUBJECTIVE AND OBJECTIVE BOX
YEISON BEDOLLA 66y Female  MRN#: [id number]     Hospital Day: 3d    Pt is currently admitted with the primary diagnosis of KAREY    SUBJECTIVE    No Overnight events     No Subjective complaints     patient seen and examined    Present Today:   - Boris:  No                                            ----------------------------------------------------------  OBJECTIVE  PAST MEDICAL & SURGICAL HISTORY  Hypertension, unspecified type    Type 2 diabetes mellitus without complication, unspecified long term insulin use status    Neuropathy    Other osteoporosis                                              -----------------------------------------------------------  ALLERGIES:  penicillin (Anaphylaxis)                                            ------------------------------------------------------------    HOME MEDICATIONS  Home Medications:  ALPRAZolam 1 mg oral tablet: 2 tab(s) orally 2 times a day, As Needed (2022 14:00)  diazePAM 5 mg oral tablet: 1 tab(s) orally 2 times a day, As Needed (2022 14:00)  HYDROmorphone 4 mg oral tablet: 1 tab(s) orally every 6 hours (2022 14:00)  Metoprolol Succinate ER 50 mg oral tablet, extended release: 1 tab(s) orally once a day (2022 14:00)  NIFEdipine 30 mg oral tablet, extended release: 1 tab(s) orally once a day (2022 14:00)  olmesartan 20 mg oral tablet: 1 tab(s) orally once a day (2022 14:00)  pioglitazone 15 mg oral tablet: 1 tab(s) orally once a day (2022 14:00)  pregabalin 200 mg oral capsule: 1 cap(s) orally 3 times a day (2022 14:00)  rosuvastatin 40 mg oral tablet: 1 tab(s) orally once a day (2022 14:00)                           MEDICATIONS:  STANDING MEDICATIONS  atorvastatin 80 milliGRAM(s) Oral at bedtime  cefTRIAXone   IVPB 1000 milliGRAM(s) IV Intermittent every 24 hours  heparin   Injectable 5000 Unit(s) SubCutaneous every 12 hours  lactated ringers. 1000 milliLiter(s) IV Continuous <Continuous>  magnesium sulfate  IVPB 2 Gram(s) IV Intermittent once  pantoprazole    Tablet 40 milliGRAM(s) Oral before breakfast  senna 2 Tablet(s) Oral at bedtime    PRN MEDICATIONS  acetaminophen     Tablet .. 650 milliGRAM(s) Oral every 6 hours PRN  diazepam    Tablet 5 milliGRAM(s) Oral every 6 hours PRN  HYDROmorphone   Tablet 4 milliGRAM(s) Oral every 6 hours PRN  melatonin 3 milliGRAM(s) Oral at bedtime PRN  pregabalin 200 milliGRAM(s) Oral three times a day PRN                                            ------------------------------------------------------------  VITAL SIGNS: Last 24 Hours  T(C): 36.6 (2022 05:39), Max: 37 (10 Apr 2022 13:37)  T(F): 97.9 (2022 05:39), Max: 98.6 (10 Apr 2022 13:37)  HR: 70 (2022 05:39) (62 - 86)  BP: 159/74 (2022 05:39) (155/86 - 174/77)  BP(mean): --  RR: 18 (2022 05:39) (18 - 18)  SpO2: --                                             --------------------------------------------------------------  LABS:                        10.3   5.14  )-----------( 138      ( 2022 08:08 )             31.9     04-11    146  |  110  |  29<H>  ----------------------------<  107<H>  4.1   |  25  |  2.0<H>    Ca    10.0      2022 08:08  Phos  3.0     -  Mg     1.7         TPro  5.9<L>  /  Alb  3.6  /  TBili  0.5  /  DBili  x   /  AST  15  /  ALT  7   /  AlkPhos  46  -11      Urinalysis Basic - ( 2022 11:40 )    Color: Light Yellow / Appearance: Clear / S.016 / pH: x  Gluc: x / Ketone: Negative  / Bili: Negative / Urobili: <2 mg/dL   Blood: x / Protein: 30 mg/dL / Nitrite: Negative   Leuk Esterase: Negative / RBC: 8 /HPF / WBC 4 /HPF   Sq Epi: x / Non Sq Epi: 5 /HPF / Bacteria: Negative        Creatine Kinase, Serum: 162 U/L (22 @ 08:08)        Culture - Urine (collected 2022 11:39)  Source: Clean Catch Clean Catch (Midstream)  Final Report (2022 09:42):    >=3 organisms. Probable collection contamination.    Culture - Urine (collected 2022 20:01)  Source: Clean Catch Clean Catch (Midstream)  Final Report (10 Apr 2022 08:04):    >=3 organisms. Probable collection contamination.        CARDIAC MARKERS ( 2022 08:08 )  x     / x     / 162 U/L / x     / x                                                  -------------------------------------------------------------  RADIOLOGY:                                            --------------------------------------------------------------    PHYSICAL EXAM:    General: NAD  LUNGS: clear air entry bilaterally no crackles or wheezes  HEART: reg s1s2, no murmurs  ABDOMEN: soft non tender non distended positive BS  EXT: no lower extremity edema bilaterally  NEURO:AAOX3  
     Pt seen and examined at bedside.       VITAL SIGNS (Last 24 hrs):  T(C): 36.3 (04-10-22 @ 05:45), Max: 36.3 (04-10-22 @ 05:45)  HR: 69 (04-10-22 @ 05:45) (67 - 79)  BP: 123/67 (04-10-22 @ 05:45) (99/54 - 123/67)  RR: 17 (04-10-22 @ 05:45) (17 - 18)         PHYSICAL EXAM:  GENERAL: NAD   HEAD:  Atraumatic, Normocephalic  EYES:  conjunctiva and sclera clear  NECK: Supple, No JVD  CHEST/LUNG: Clear to auscultation bilaterally; No wheeze  HEART: Regular rate and rhythm; No murmurs, rubs, or gallops  ABDOMEN: Soft, Nontender, Nondistended; Bowel sounds present  EXTREMITIES:  2+ Peripheral Pulses, No clubbing, cyanosis, or edema  PSYCH: AAOx3  NEUROLOGY: non-focal  SKIN: No rashes or lesions    Labs Reviewed  Spoke to patient in regards to abnormal labs.    CBC Full  -  ( 10 Apr 2022 08:34 )  WBC Count : 3.59 K/uL  Hemoglobin : 10.1 g/dL  Hematocrit : 30.7 %  Platelet Count - Automated : 117 K/uL  Mean Cell Volume : 85.8 fL  Mean Cell Hemoglobin : 28.2 pg  Mean Cell Hemoglobin Concentration : 32.9 g/dL  Auto Neutrophil # : 2.37 K/uL  Auto Lymphocyte # : 0.73 K/uL  Auto Monocyte # : 0.38 K/uL  Auto Eosinophil # : 0.09 K/uL  Auto Basophil # : 0.01 K/uL  Auto Neutrophil % : 66.0 %  Auto Lymphocyte % : 20.3 %  Auto Monocyte % : 10.6 %  Auto Eosinophil % : 2.5 %  Auto Basophil % : 0.3 %    BMP:    04-10 @ 08:34    Blood Urea Nitrogen - 36  Calcium - 9.6  Carbon Dioxide - 25  Chloride - 105  Creatinine - 2.7  Glucose - 102  Potassium - 4.5  Sodium - 142       PT/INR - ( 08 Apr 2022 19:38 )   PT: 11.80 sec;   INR: 1.03 ratio         PTT - ( 08 Apr 2022 19:38 )  PTT:35.8 sec  Urine Culture:  04-08 @ 20:01 Urine culture: --    Culture Results:   >=3 organisms. Probable collection contamination.  Method Type: --  Organism: --  Organism Identification: --  Specimen Source: Clean Catch Clean Catch (Midstream)           MEDICATIONS  (STANDING):  atorvastatin 80 milliGRAM(s) Oral at bedtime  heparin   Injectable 5000 Unit(s) SubCutaneous every 12 hours  lactated ringers. 1000 milliLiter(s) (75 mL/Hr) IV Continuous <Continuous>  lactulose Syrup 20 Gram(s) Oral three times a day  pantoprazole    Tablet 40 milliGRAM(s) Oral before breakfast  pregabalin 75 milliGRAM(s) Oral daily  senna 2 Tablet(s) Oral at bedtime    MEDICATIONS  (PRN):  acetaminophen     Tablet .. 650 milliGRAM(s) Oral every 6 hours PRN Temp greater or equal to 38C (100.4F), Mild Pain (1 - 3)  diazepam    Tablet 5 milliGRAM(s) Oral every 6 hours PRN anxiety  HYDROmorphone   Tablet 4 milliGRAM(s) Oral every 6 hours PRN Severe Pain (7 - 10)  melatonin 3 milliGRAM(s) Oral at bedtime PRN Insomnia  
     Pt seen and examined at bedside.         VITAL SIGNS (Last 24 hrs):  T(C): 36.3 (04-09-22 @ 10:51), Max: 36.9 (04-09-22 @ 05:43)  HR: 69 (04-09-22 @ 10:51) (64 - 72)  BP: 97/51 (04-09-22 @ 10:51) (83/38 - 109/55)  RR: 17 (04-09-22 @ 10:51) (17 - 19)  SpO2: 94% (04-09-22 @ 10:51) (94% - 97%)  Wt(kg): --  Daily     Daily     I&O's Summary      PHYSICAL EXAM:  GENERAL: NAD   HEAD:  Atraumatic, Normocephalic  EYES:  conjunctiva and sclera clear  NECK: Supple, No JVD  CHEST/LUNG: Clear to auscultation bilaterally; No wheeze  HEART: Regular rate and rhythm; No murmurs, rubs, or gallops  ABDOMEN: Soft, Nontender, Nondistended; Bowel sounds present  EXTREMITIES:  2+ Peripheral Pulses, No clubbing, cyanosis, or edema  PSYCH: AAOx3  NEUROLOGY: non-focal  SKIN: No rashes or lesions    Labs Reviewed       CBC Full  -  ( 09 Apr 2022 07:56 )  WBC Count : 4.21 K/uL  Hemoglobin : 10.9 g/dL  Hematocrit : 33.5 %  Platelet Count - Automated : 125 K/uL  Mean Cell Volume : 88.2 fL  Mean Cell Hemoglobin : 28.7 pg  Mean Cell Hemoglobin Concentration : 32.5 g/dL  Auto Neutrophil # : 2.23 K/uL  Auto Lymphocyte # : 1.17 K/uL  Auto Monocyte # : 0.64 K/uL  Auto Eosinophil # : 0.15 K/uL  Auto Basophil # : 0.01 K/uL  Auto Neutrophil % : 53.0 %  Auto Lymphocyte % : 27.8 %  Auto Monocyte % : 15.2 %  Auto Eosinophil % : 3.6 %  Auto Basophil % : 0.2 %    BMP:    04-09 @ 07:56    Blood Urea Nitrogen - 38  Calcium - 9.6  Carbon  Dioxide - 25  Chloride - 103  Creatinine - 2.9  Glucose - 99  Potassium - 4.1  Sodium - 140         PT/INR - ( 08 Apr 2022 19:38 )   PT: 11.80 sec;   INR: 1.03 ratio         PTT - ( 08 Apr 2022 19:38 )  PTT:35.8 sec       MEDICATIONS  (STANDING):  atorvastatin 80 milliGRAM(s) Oral at bedtime  cefTRIAXone   IVPB 1000 milliGRAM(s) IV Intermittent every 24 hours  heparin   Injectable 5000 Unit(s) SubCutaneous every 12 hours  lactated ringers. 1000 milliLiter(s) (75 mL/Hr) IV Continuous <Continuous>  pantoprazole    Tablet 40 milliGRAM(s) Oral before breakfast  pregabalin 75 milliGRAM(s) Oral daily    MEDICATIONS  (PRN):  acetaminophen     Tablet .. 650 milliGRAM(s) Oral every 6 hours PRN Temp greater or equal to 38C (100.4F), Mild Pain (1 - 3)  diazepam    Tablet 5 milliGRAM(s) Oral every 6 hours PRN anxiety  HYDROmorphone   Tablet 4 milliGRAM(s) Oral every 6 hours PRN Severe Pain (7 - 10)  melatonin 3 milliGRAM(s) Oral at bedtime PRN Insomnia

## 2022-04-12 ENCOUNTER — TRANSCRIPTION ENCOUNTER (OUTPATIENT)
Age: 67
End: 2022-04-12

## 2022-04-12 VITALS
SYSTOLIC BLOOD PRESSURE: 151 MMHG | RESPIRATION RATE: 18 BRPM | TEMPERATURE: 96 F | HEART RATE: 74 BPM | DIASTOLIC BLOOD PRESSURE: 67 MMHG

## 2022-04-12 LAB
ANION GAP SERPL CALC-SCNC: 12 MMOL/L — SIGNIFICANT CHANGE UP (ref 7–14)
BASOPHILS # BLD AUTO: 0 K/UL — SIGNIFICANT CHANGE UP (ref 0–0.2)
BASOPHILS NFR BLD AUTO: 0 % — SIGNIFICANT CHANGE UP (ref 0–1)
BUN SERPL-MCNC: 22 MG/DL — HIGH (ref 10–20)
CALCIUM SERPL-MCNC: 9.8 MG/DL — SIGNIFICANT CHANGE UP (ref 8.5–10.1)
CHLORIDE SERPL-SCNC: 106 MMOL/L — SIGNIFICANT CHANGE UP (ref 98–110)
CO2 SERPL-SCNC: 24 MMOL/L — SIGNIFICANT CHANGE UP (ref 17–32)
CREAT SERPL-MCNC: 1.6 MG/DL — HIGH (ref 0.7–1.5)
EGFR: 36 ML/MIN/1.73M2 — LOW
EOSINOPHIL # BLD AUTO: 0.06 K/UL — SIGNIFICANT CHANGE UP (ref 0–0.7)
EOSINOPHIL NFR BLD AUTO: 1.2 % — SIGNIFICANT CHANGE UP (ref 0–8)
GLUCOSE BLDC GLUCOMTR-MCNC: 108 MG/DL — HIGH (ref 70–99)
GLUCOSE SERPL-MCNC: 113 MG/DL — HIGH (ref 70–99)
HCT VFR BLD CALC: 35.2 % — LOW (ref 37–47)
HGB BLD-MCNC: 11.4 G/DL — LOW (ref 12–16)
IMM GRANULOCYTES NFR BLD AUTO: 0.4 % — HIGH (ref 0.1–0.3)
LYMPHOCYTES # BLD AUTO: 0.51 K/UL — LOW (ref 1.2–3.4)
LYMPHOCYTES # BLD AUTO: 9.9 % — LOW (ref 20.5–51.1)
MAGNESIUM SERPL-MCNC: 2 MG/DL — SIGNIFICANT CHANGE UP (ref 1.8–2.4)
MCHC RBC-ENTMCNC: 27.7 PG — SIGNIFICANT CHANGE UP (ref 27–31)
MCHC RBC-ENTMCNC: 32.4 G/DL — SIGNIFICANT CHANGE UP (ref 32–37)
MCV RBC AUTO: 85.6 FL — SIGNIFICANT CHANGE UP (ref 81–99)
MONOCYTES # BLD AUTO: 0.4 K/UL — SIGNIFICANT CHANGE UP (ref 0.1–0.6)
MONOCYTES NFR BLD AUTO: 7.8 % — SIGNIFICANT CHANGE UP (ref 1.7–9.3)
NEUTROPHILS # BLD AUTO: 4.17 K/UL — SIGNIFICANT CHANGE UP (ref 1.4–6.5)
NEUTROPHILS NFR BLD AUTO: 80.7 % — HIGH (ref 42.2–75.2)
NRBC # BLD: 0 /100 WBCS — SIGNIFICANT CHANGE UP (ref 0–0)
PLATELET # BLD AUTO: 153 K/UL — SIGNIFICANT CHANGE UP (ref 130–400)
POTASSIUM SERPL-MCNC: 3.8 MMOL/L — SIGNIFICANT CHANGE UP (ref 3.5–5)
POTASSIUM SERPL-SCNC: 3.8 MMOL/L — SIGNIFICANT CHANGE UP (ref 3.5–5)
RBC # BLD: 4.11 M/UL — LOW (ref 4.2–5.4)
RBC # FLD: 13.5 % — SIGNIFICANT CHANGE UP (ref 11.5–14.5)
SODIUM SERPL-SCNC: 142 MMOL/L — SIGNIFICANT CHANGE UP (ref 135–146)
WBC # BLD: 5.16 K/UL — SIGNIFICANT CHANGE UP (ref 4.8–10.8)
WBC # FLD AUTO: 5.16 K/UL — SIGNIFICANT CHANGE UP (ref 4.8–10.8)

## 2022-04-12 PROCEDURE — 99239 HOSP IP/OBS DSCHRG MGMT >30: CPT

## 2022-04-12 RX ORDER — ALPRAZOLAM 0.25 MG
2 TABLET ORAL ONCE
Refills: 0 | Status: DISCONTINUED | OUTPATIENT
Start: 2022-04-12 | End: 2022-04-12

## 2022-04-12 RX ORDER — OLMESARTAN MEDOXOMIL 5 MG/1
1 TABLET, FILM COATED ORAL
Qty: 0 | Refills: 0 | DISCHARGE

## 2022-04-12 RX ORDER — METOPROLOL TARTRATE 50 MG
1 TABLET ORAL
Qty: 0 | Refills: 0 | DISCHARGE

## 2022-04-12 RX ADMIN — Medication 2 MILLIGRAM(S): at 13:09

## 2022-04-12 RX ADMIN — HYDROMORPHONE HYDROCHLORIDE 4 MILLIGRAM(S): 2 INJECTION INTRAMUSCULAR; INTRAVENOUS; SUBCUTANEOUS at 06:51

## 2022-04-12 RX ADMIN — HYDROMORPHONE HYDROCHLORIDE 4 MILLIGRAM(S): 2 INJECTION INTRAMUSCULAR; INTRAVENOUS; SUBCUTANEOUS at 08:25

## 2022-04-12 RX ADMIN — PANTOPRAZOLE SODIUM 40 MILLIGRAM(S): 20 TABLET, DELAYED RELEASE ORAL at 06:51

## 2022-04-12 RX ADMIN — Medication 30 MILLIGRAM(S): at 11:53

## 2022-04-12 RX ADMIN — HEPARIN SODIUM 5000 UNIT(S): 5000 INJECTION INTRAVENOUS; SUBCUTANEOUS at 06:52

## 2022-04-12 NOTE — DISCHARGE NOTE PROVIDER - NSDCMRMEDTOKEN_GEN_ALL_CORE_FT
ALPRAZolam 1 mg oral tablet: 2 tab(s) orally 2 times a day, As Needed  diazePAM 5 mg oral tablet: 1 tab(s) orally 2 times a day, As Needed  HYDROmorphone 4 mg oral tablet: 1 tab(s) orally every 6 hours  Metoprolol Succinate ER 50 mg oral tablet, extended release: 1 tab(s) orally once a day  NIFEdipine 30 mg oral tablet, extended release: 1 tab(s) orally once a day  olmesartan 20 mg oral tablet: 1 tab(s) orally once a day  pioglitazone 15 mg oral tablet: 1 tab(s) orally once a day  pregabalin 200 mg oral capsule: 1 cap(s) orally 3 times a day  rosuvastatin 40 mg oral tablet: 1 tab(s) orally once a day   ALPRAZolam 1 mg oral tablet: 2 tab(s) orally 2 times a day, As Needed  diazePAM 5 mg oral tablet: 1 tab(s) orally 2 times a day, As Needed  HYDROmorphone 4 mg oral tablet: 1 tab(s) orally every 6 hours  NIFEdipine 30 mg oral tablet, extended release: 1 tab(s) orally once a day  pioglitazone 15 mg oral tablet: 1 tab(s) orally once a day  pregabalin 200 mg oral capsule: 1 cap(s) orally 3 times a day  rosuvastatin 40 mg oral tablet: 1 tab(s) orally once a day

## 2022-04-12 NOTE — DISCHARGE NOTE PROVIDER - NSDCCPCAREPLAN_GEN_ALL_CORE_FT
PRINCIPAL DISCHARGE DIAGNOSIS  Diagnosis: KAREY (acute kidney injury)  Assessment and Plan of Treatment: you presented to the ED for a  mechanical fall after tripping over your granddaughter's toys while walking and hitting your head -> then woke up this morning with headache and confusion.you also mentioned urinary dribbling and urgency for the past week. Trauma workup including CTH, CT cervical spine, CTAP, CT chest, XRAY pelvis negative for fracture. you were admitted for further management. you were found to have a urinary tract infection and an acute kidney injury. you received IV antibiotic (ceftriaxone) for your UTI and IV fluids for your KAREY with improvement in mental status.  you should follow up with your PCP      SECONDARY DISCHARGE DIAGNOSES  Diagnosis: Acute UTI  Assessment and Plan of Treatment: you presented to the ED for a  mechanical fall after tripping over your granddaughter's toys while walking and hitting your head -> then woke up this morning with headache and confusion.you also mentioned urinary dribbling and urgency for the past week. Trauma workup including CTH, CT cervical spine, CTAP, CT chest, XRAY pelvis negative for fracture. you were admitted for further management. you were found to have a urinary tract infection and an acute kidney injury. you received IV antibiotic (ceftriaxone) for your UTI and IV fluids for your KAREY with improvement in mental status.  you should follow up with your PCP     PRINCIPAL DISCHARGE DIAGNOSIS  Diagnosis: KAREY (acute kidney injury)  Assessment and Plan of Treatment: you presented to the ED for a  mechanical fall after tripping over your granddaughter's toys while walking and hitting your head -> then woke up this morning with headache and confusion.you also mentioned urinary dribbling and urgency for the past week. Trauma workup including CTH, CT cervical spine, CTAP, CT chest, XRAY pelvis negative for fracture. you were admitted for further management. you were found to have a urinary tract infection and an acute kidney injury. you received IV antibiotic (ceftriaxone) for your UTI and IV fluids for your KAREY with improvement in mental status. we stopped your olmesartan -> please follow up with your PCP to see when its appropriate to restart it. your heart rate was well controlled without the metoprolol so it was stopped  you should follow up with your PCP      SECONDARY DISCHARGE DIAGNOSES  Diagnosis: Acute UTI  Assessment and Plan of Treatment: you presented to the ED for a  mechanical fall after tripping over your granddaughter's toys while walking and hitting your head -> then woke up this morning with headache and confusion.you also mentioned urinary dribbling and urgency for the past week. Trauma workup including CTH, CT cervical spine, CTAP, CT chest, XRAY pelvis negative for fracture. you were admitted for further management. you were found to have a urinary tract infection and an acute kidney injury. you received IV antibiotic (ceftriaxone) for your UTI and IV fluids for your KAREY with improvement in mental status.  you should follow up with your PCP

## 2022-04-12 NOTE — DISCHARGE NOTE PROVIDER - NSDCFUADDINST_GEN_ALL_CORE_FT
please follow up with your PCP  your olmesartan was stopped because it can exacerbate your BP. when your kidney function normalize and you are still hypertensive -> follow up with your PCP to possibly reinstate it

## 2022-04-12 NOTE — DISCHARGE NOTE NURSING/CASE MANAGEMENT/SOCIAL WORK - PATIENT PORTAL LINK FT
You can access the FollowMyHealth Patient Portal offered by [facility] by registering at the following website: [email or web address]. By joining [facility]’s FollowMyHealth portal, you will also be able to view your health information using other applications (apps) compatible with our system.

## 2022-04-12 NOTE — DISCHARGE NOTE PROVIDER - CARE PROVIDER_API CALL
Jarek Rodriguez)  Internal Medicine  [address] [place] [address]  Phone: [phone]  Fax: [phone]  Established Patient  Follow Up Time:

## 2022-04-12 NOTE — DISCHARGE NOTE PROVIDER - HOSPITAL COURSE
65 yo female pt  w/ hx of HTN, HLD, DM, CKD, OA chronic knee pain, anxiety, presenting s/p mechanical fall yesterday. Pt states she tripped over her granddaughter's toys while walking falling onto her BL knees and then forward hitting her head. Pt remembers all event regarding the fall. Pt later went to bed with no complaints, then woke up this morning with headache and confusion. Pt lives with son who noticed that pt was more somnolent than usual. Pt endorses bl shin and knee pain at the time of my examination. She denies any headache, dizziness, nausea, vomit, chest pain, SOB, diarrhea, or constipation. ROS was positive for urinary dribbling and urgency for the past week, but no pain upon urination. Pt did also mention that her PO intake has decreased over the past few weeks.     ED course:   VS afebrile, HR 72, SpO2 99% on RA, BP 83/38 improved to 110/56 sp 2L LR   labs: no white cnt, Hgb 11. 3, Crea 3.3,    Trauma w/up including CTH, CT cervical spine, CTAP, CT chest, XRAY pelvis negative for fracture  admitted for further management. she received ceftriaxone for her UTI and IV fluids for her KAREY with improvement in her mental status 65 yo female pt  w/ hx of HTN, HLD, DM, CKD, OA chronic knee pain, anxiety, presenting s/p mechanical fall yesterday. Pt states she tripped over her granddaughter's toys while walking falling onto her BL knees and then forward hitting her head. Pt remembers all event regarding the fall. Pt later went to bed with no complaints, then woke up this morning with headache and confusion. Pt lives with son who noticed that pt was more somnolent than usual. Pt endorses bl shin and knee pain at the time of my examination. She denies any headache, dizziness, nausea, vomit, chest pain, SOB, diarrhea, or constipation. ROS was positive for urinary dribbling and urgency for the past week, but no pain upon urination. Pt did also mention that her PO intake has decreased over the past few weeks.     ED course:   VS afebrile, HR 72, SpO2 99% on RA, BP 83/38 improved to 110/56 sp 2L LR   labs: no white cnt, Hgb 11. 3, Crea 3.3,    Trauma w/up including CTH, CT cervical spine, CTAP, CT chest, XRAY pelvis negative for fracture  admitted for further management. ARB on hold. she received ceftriaxone for her UTI and IV fluids for her KAREY with improvement in her mental status

## 2022-04-12 NOTE — CONSULT NOTE ADULT - ASSESSMENT
acute kidney injury   - improved  - due to hypotensive ischemic injury with some ATN on admission  CKD stage 3  prior bouts of KAREY in past  HTn  hypertensive heart disease  DM2  fall / encephalopathy    plan:    for dc home today  s/p IVF  ARB on hold  outpatient follow up my office with bmp check and BP med adjustment  ckd education  full code  d/w Dr. Rendon

## 2022-04-12 NOTE — CONSULT NOTE ADULT - SUBJECTIVE AND OBJECTIVE BOX
NEPHROLOGY CONSULTATION NOTE    65 yo female pt  w/ hx of HTN, HLD, DM, CKD, OA chronic knee pain, anxiety, presenting s/p mechanical fall yesterday. Pt states she tripped over her granddaughter's toys while walking falling onto her BL knees and then forward hitting her head. Pt remembers all event regarding the fall. Pt later went to bed with no complaints, then woke up this morning with headache and confusion. Pt lives with son who noticed that pt was more somnolent than usual. Pt endorses bl shin and knee pain at the time of my examination. She denies any headache, dizziness, nausea, vomit, chest pain, SOB, diarrhea, or constipation. ROS was positive for urinary dribbling and urgency for the past week, but no pain upon urination. Pt did also mention that her PO intake has decreased over the past few weeks.    PAST MEDICAL & SURGICAL HISTORY:  Hypertension, unspecified type    Type 2 diabetes mellitus without complication, unspecified long term insulin use status    Neuropathy    Other osteoporosis      Allergies:  penicillin (Anaphylaxis)    Home Medications Reviewed    SOCIAL HISTORY:  Denies ETOH,Smoking,   FAMILY HISTORY:        REVIEW OF SYSTEMS:  CONSTITUTIONAL: No weakness, fevers or chills  EYES/ENT: No visual changes;  No vertigo or throat pain   NECK: No pain or stiffness  RESPIRATORY: No cough, wheezing, hemoptysis; No shortness of breath  CARDIOVASCULAR: No chest pain or palpitations.  GASTROINTESTINAL: No abdominal or epigastric pain. No nausea, vomiting, or hematemesis; No diarrhea or constipation. No melena or hematochezia.  GENITOURINARY: No dysuria, frequency, foamy urine, urinary urgency, incontinence or hematuria  NEUROLOGICAL: No numbness or weakness  SKIN: No itching, burning, rashes, or lesions   VASCULAR: No bilateral lower extremity edema.   All other review of systems is negative unless indicated above.    PHYSICAL EXAM:  Constitutional: NAD  HEENT: anicteric sclera, oropharynx clear, MMM  Neck: No JVD  Respiratory: CTAB, no wheezes, rales or rhonchi  Cardiovascular: S1, S2, RRR  Gastrointestinal: BS+, soft, NT/ND  Extremities: No cyanosis or clubbing. No peripheral edema  Neurological: A/O x 3, no focal deficits  Psychiatric: Normal mood, normal affect  : No CVA tenderness. No hein.   Skin: No rashes    Hospital Medications:   MEDICATIONS  (STANDING):  atorvastatin 80 milliGRAM(s) Oral at bedtime  heparin   Injectable 5000 Unit(s) SubCutaneous every 12 hours  lactated ringers. 1000 milliLiter(s) (75 mL/Hr) IV Continuous <Continuous>  NIFEdipine XL 30 milliGRAM(s) Oral every 24 hours  pantoprazole    Tablet 40 milliGRAM(s) Oral before breakfast  senna 2 Tablet(s) Oral at bedtime        VITALS:  T(F): 97.3 (22 @ 05:37), Max: 99.1 (22 @ 20:02)  HR: 66 (22 @ 05:37)  BP: 142/77 (22 @ 05:37)  RR: 18 (22 @ 05:37)  SpO2: 97% (22 @ 05:37)  Wt(kg): --    Height (cm): 167.6 ( @ 08:26)    LABS:      142  |  106  |  22<H>  ----------------------------<  113<H>  3.8   |  24  |  1.6<H>    Ca    9.8      2022 08:40  Phos  3.0       Mg     2.0         TPro  5.9<L>  /  Alb  3.6  /  TBili  0.5  /  DBili      /  AST  15  /  ALT  7   /  AlkPhos  46  -11                          11.4   5.16  )-----------( 153      ( 2022 08:40 )             35.2       Urine Studies:  Urinalysis Basic - ( 2022 11:40 )    Color: Light Yellow / Appearance: Clear / S.016 / pH:   Gluc:  / Ketone: Negative  / Bili: Negative / Urobili: <2 mg/dL   Blood:  / Protein: 30 mg/dL / Nitrite: Negative   Leuk Esterase: Negative / RBC: 8 /HPF / WBC 4 /HPF   Sq Epi:  / Non Sq Epi: 5 /HPF / Bacteria: Negative          RADIOLOGY & ADDITIONAL STUDIES:

## 2022-04-12 NOTE — CDI QUERY NOTE - NSCDIOTHERTXTBX_GEN_ALL_CORE_HH
Dr. Rendon,    67 y/o admitted after fall hitting head.  Clinical indicators:  Hypotensive on admission 80's/50's, 94/54, 97/51  Creatinine on 4/8 = 3.3  Creatinine 4/9 2.9  Creatinine 4/10 2.7  Creatinine 4/11 2.0  (baseline creatinine 1.2-1.4)    CT abdomen with IV contrast 4/8  CT chest with IV contrast 4/8    Treatment:  Lactated Ringers IV  2 L in ED                     Lactated Ringers @75 cc/hr     PN 4/9/22:  #KAREY on CKD, pre-renal vs ATN due to hypotension   - no hydronephrosis on CTAP  - sp 2L LR in ED  - c/w LR @75cc/hr   - avoid nephrotoxic drugs   - hold antihypertensives     Please clarify the diagnosis of ATN:    -ATN due to hypotension was ruled in after study  -ATN was ruled out after study  -Other (please specify)  -Clinically unable to determine  Thank you  Corinne Flores

## 2022-04-22 DIAGNOSIS — E78.5 HYPERLIPIDEMIA, UNSPECIFIED: ICD-10-CM

## 2022-04-22 DIAGNOSIS — N17.0 ACUTE KIDNEY FAILURE WITH TUBULAR NECROSIS: ICD-10-CM

## 2022-04-22 DIAGNOSIS — R53.1 WEAKNESS: ICD-10-CM

## 2022-04-22 DIAGNOSIS — D64.9 ANEMIA, UNSPECIFIED: ICD-10-CM

## 2022-04-22 DIAGNOSIS — G93.40 ENCEPHALOPATHY, UNSPECIFIED: ICD-10-CM

## 2022-04-22 DIAGNOSIS — S06.0X0A CONCUSSION WITHOUT LOSS OF CONSCIOUSNESS, INITIAL ENCOUNTER: ICD-10-CM

## 2022-04-22 DIAGNOSIS — E11.40 TYPE 2 DIABETES MELLITUS WITH DIABETIC NEUROPATHY, UNSPECIFIED: ICD-10-CM

## 2022-04-22 DIAGNOSIS — M17.0 BILATERAL PRIMARY OSTEOARTHRITIS OF KNEE: ICD-10-CM

## 2022-04-22 DIAGNOSIS — E11.22 TYPE 2 DIABETES MELLITUS WITH DIABETIC CHRONIC KIDNEY DISEASE: ICD-10-CM
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
## 2022-04-22 DIAGNOSIS — I12.9 HYPERTENSIVE CHRONIC KIDNEY DISEASE WITH STAGE 1 THROUGH STAGE 4 CHRONIC KIDNEY DISEASE, OR UNSPECIFIED CHRONIC KIDNEY DISEASE: ICD-10-CM

## 2022-04-22 DIAGNOSIS — N39.0 URINARY TRACT INFECTION, SITE NOT SPECIFIED: ICD-10-CM

## 2022-04-22 DIAGNOSIS — E83.42 HYPOMAGNESEMIA: ICD-10-CM

## 2022-04-22 DIAGNOSIS — F41.9 ANXIETY DISORDER, UNSPECIFIED: ICD-10-CM

## 2022-04-22 DIAGNOSIS — N18.30 CHRONIC KIDNEY DISEASE, STAGE 3 UNSPECIFIED: ICD-10-CM
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
## 2022-04-22 DIAGNOSIS — M81.0 AGE-RELATED OSTEOPOROSIS WITHOUT CURRENT PATHOLOGICAL FRACTURE: ICD-10-CM

## 2022-04-22 DIAGNOSIS — I95.9 HYPOTENSION, UNSPECIFIED: ICD-10-CM

## 2022-12-15 NOTE — ED PROVIDER NOTE - CHIEF COMPLAINT
The patient is a 66y Female complaining of fall.
Normal vision: sees adequately in most situations; can see medication labels, newsprint

## 2023-09-15 NOTE — ED ADULT NURSE NOTE - NSSISCREENINGQ3_ED_A_ED
Calorie Count  Intake recorded for: 9/14  Total Kcals: 764 Total Protein: 30g  Kcals from Hospital Food: 764   Protein: 30g  Kcals from Outside Food (average): 0 Protein: 0g  # Meals Ordered from Kitchen: 1  # Meals Recorded: 1 (Meal 1: 100% white toast, scrambled eggs with cheese, grapes)   # Supplements Recorded: 100% 1 Ensure Clear      No

## 2023-11-25 ENCOUNTER — OUTPATIENT (OUTPATIENT)
Dept: OUTPATIENT SERVICES | Facility: HOSPITAL | Age: 68
LOS: 1 days | End: 2023-11-25
Payer: MEDICARE

## 2023-11-25 DIAGNOSIS — M54.9 DORSALGIA, UNSPECIFIED: ICD-10-CM

## 2023-11-25 PROCEDURE — 72202 X-RAY EXAM SI JOINTS 3/> VWS: CPT | Mod: 26

## 2023-11-25 PROCEDURE — 72110 X-RAY EXAM L-2 SPINE 4/>VWS: CPT

## 2023-11-25 PROCEDURE — 72202 X-RAY EXAM SI JOINTS 3/> VWS: CPT

## 2023-11-25 PROCEDURE — 72110 X-RAY EXAM L-2 SPINE 4/>VWS: CPT | Mod: 26

## 2023-11-26 DIAGNOSIS — M54.9 DORSALGIA, UNSPECIFIED: ICD-10-CM

## 2023-11-28 ENCOUNTER — APPOINTMENT (OUTPATIENT)
Dept: PODIATRY | Facility: CLINIC | Age: 68
End: 2023-11-28

## 2024-02-21 ENCOUNTER — RESULT CHARGE (OUTPATIENT)
Age: 69
End: 2024-02-21

## 2024-02-21 ENCOUNTER — APPOINTMENT (OUTPATIENT)
Dept: OBGYN | Facility: CLINIC | Age: 69
End: 2024-02-21
Payer: MEDICARE

## 2024-02-21 VITALS — SYSTOLIC BLOOD PRESSURE: 128 MMHG | DIASTOLIC BLOOD PRESSURE: 70 MMHG

## 2024-02-21 VITALS — HEIGHT: 66 IN | WEIGHT: 150 LBS | BODY MASS INDEX: 24.11 KG/M2

## 2024-02-21 DIAGNOSIS — Z87.39 PERSONAL HISTORY OF OTHER DISEASES OF THE MUSCULOSKELETAL SYSTEM AND CONNECTIVE TISSUE: ICD-10-CM

## 2024-02-21 DIAGNOSIS — Z78.9 OTHER SPECIFIED HEALTH STATUS: ICD-10-CM

## 2024-02-21 DIAGNOSIS — N76.0 ACUTE VAGINITIS: ICD-10-CM

## 2024-02-21 DIAGNOSIS — N32.81 OVERACTIVE BLADDER: ICD-10-CM

## 2024-02-21 DIAGNOSIS — F43.10 POST-TRAUMATIC STRESS DISORDER, UNSPECIFIED: ICD-10-CM

## 2024-02-21 LAB
BILIRUB UR QL STRIP: NORMAL
GLUCOSE UR-MCNC: NORMAL
HCG UR QL: 0.2 EU/DL
HGB UR QL STRIP.AUTO: NORMAL
KETONES UR-MCNC: NORMAL
LEUKOCYTE ESTERASE UR QL STRIP: NORMAL
NITRITE UR QL STRIP: NORMAL
PH UR STRIP: 6
PROT UR STRIP-MCNC: NORMAL
SP GR UR STRIP: 1.01

## 2024-02-21 PROCEDURE — 99203 OFFICE O/P NEW LOW 30 MIN: CPT

## 2024-02-21 RX ORDER — ROSUVASTATIN CALCIUM 5 MG/1
TABLET, FILM COATED ORAL
Refills: 0 | Status: ACTIVE | COMMUNITY

## 2024-02-21 RX ORDER — MORPHINE SULFATE 60 MG/1
TABLET ORAL
Refills: 0 | Status: ACTIVE | COMMUNITY

## 2024-02-21 RX ORDER — DIAZEPAM 5 MG/1
TABLET ORAL
Refills: 0 | Status: ACTIVE | COMMUNITY

## 2024-02-21 RX ORDER — PIOGLITAZONE HYDROCHLORIDE 45 MG/1
TABLET ORAL
Refills: 0 | Status: ACTIVE | COMMUNITY

## 2024-02-21 RX ORDER — ALPRAZOLAM 2 MG/1
TABLET ORAL
Refills: 0 | Status: ACTIVE | COMMUNITY

## 2024-02-21 NOTE — DISCUSSION/SUMMARY
[FreeTextEntry1] : 67 yo  with urge incontinence - Discussed fluid is likely urine from urge incontinence. - Discussed diagnosis of urge incontinence. Discussed that treatment for each can include medical management or surgical management, as well as pelvic floor physical therapy. Referral for urogynecology Dr. Ordonez/Dr. Riggins given for further evaluation and discussion. - Vaginitis panel collected to rule out infection, however discussed that exam is normal today - Return for annual visit

## 2024-02-21 NOTE — HISTORY OF PRESENT ILLNESS
[FreeTextEntry1] : 69 yo  presents for 1 month of thin watery discharge from the vagina. The fluid leaks without warning, it is painless. She has no bleeding or pain along with the discharge. She denies leakage with coughing, sneezing, or laughing.  She has chronic pain for which she follows with pain management, mostly in her lower back. She recently saw a chiropractor who helped her feel much better. She follows regularly with her PCP and nephrologist.  She last saw a gynecologist 9 years ago.  OB:  vaginal delivery x3 (daughter passed away age 21) GYN: Denies h/o fibroids, cysts, abnormal pap smears or STIs. PMH: Diabetes, osteoporosis, osteoarthritis, PTSD, hypertension, hyperlipideamia PSH: Denies Meds: pioglitazone, valium, xanax, MS contin, rosuvastatin, "blood pressure medicine" Family: Daughter and  passed away separately, both 9 years ago Social: Retired RN

## 2024-02-21 NOTE — PHYSICAL EXAM
[Appropriately responsive] : appropriately responsive [Alert] : alert [No Acute Distress] : no acute distress [Soft] : soft [Non-tender] : non-tender [Non-distended] : non-distended [No Lesions] : no lesions [No Mass] : no mass [Oriented x3] : oriented x3 [Vulvar Atrophy] : vulvar atrophy [Labia Majora] : normal [Labia Minora] : normal [Atrophy] : atrophy [No Bleeding] : There was no active vaginal bleeding [Normal] : normal [Tenderness] : nontender [Enlarged ___ wks] : not enlarged [Mass ___ cm] : no uterine mass was palpated [Uterine Adnexae] : normal [FreeTextEntry4] : No discharge, no lesions [FreeTextEntry5] : Atrophic

## 2024-02-23 LAB
BV BACTERIA RRNA VAG QL NAA+PROBE: NOT DETECTED
C GLABRATA RNA VAG QL NAA+PROBE: NOT DETECTED
C TRACH RRNA SPEC QL NAA+PROBE: NOT DETECTED
CANDIDA RRNA VAG QL PROBE: NOT DETECTED
N GONORRHOEA RRNA SPEC QL NAA+PROBE: NOT DETECTED
T VAGINALIS RRNA SPEC QL NAA+PROBE: NOT DETECTED

## 2024-05-21 ENCOUNTER — APPOINTMENT (OUTPATIENT)
Dept: OBGYN | Facility: CLINIC | Age: 69
End: 2024-05-21

## 2025-02-24 ENCOUNTER — OUTPATIENT (OUTPATIENT)
Dept: OUTPATIENT SERVICES | Facility: HOSPITAL | Age: 70
LOS: 1 days | End: 2025-02-24
Payer: MEDICARE

## 2025-02-24 ENCOUNTER — NON-APPOINTMENT (OUTPATIENT)
Age: 70
End: 2025-02-24

## 2025-02-24 ENCOUNTER — RESULT REVIEW (OUTPATIENT)
Age: 70
End: 2025-02-24

## 2025-02-24 ENCOUNTER — APPOINTMENT (OUTPATIENT)
Dept: NEUROSURGERY | Facility: CLINIC | Age: 70
End: 2025-02-24
Payer: MEDICARE

## 2025-02-24 VITALS — HEIGHT: 66 IN | WEIGHT: 140 LBS | BODY MASS INDEX: 22.5 KG/M2

## 2025-02-24 DIAGNOSIS — G89.4 CHRONIC PAIN SYNDROME: ICD-10-CM

## 2025-02-24 PROCEDURE — 99203 OFFICE O/P NEW LOW 30 MIN: CPT

## 2025-02-24 PROCEDURE — 72070 X-RAY EXAM THORAC SPINE 2VWS: CPT

## 2025-02-24 PROCEDURE — 72100 X-RAY EXAM L-S SPINE 2/3 VWS: CPT

## 2025-02-24 PROCEDURE — 72070 X-RAY EXAM THORAC SPINE 2VWS: CPT | Mod: 26

## 2025-02-24 PROCEDURE — 72100 X-RAY EXAM L-S SPINE 2/3 VWS: CPT | Mod: 26

## 2025-02-25 DIAGNOSIS — G89.4 CHRONIC PAIN SYNDROME: ICD-10-CM

## 2025-03-03 ENCOUNTER — APPOINTMENT (OUTPATIENT)
Dept: NEUROSURGERY | Facility: CLINIC | Age: 70
End: 2025-03-03
Payer: MEDICARE

## 2025-03-03 VITALS — HEIGHT: 66 IN | BODY MASS INDEX: 22.5 KG/M2 | WEIGHT: 140 LBS

## 2025-03-03 DIAGNOSIS — G89.4 CHRONIC PAIN SYNDROME: ICD-10-CM

## 2025-03-03 PROCEDURE — 99204 OFFICE O/P NEW MOD 45 MIN: CPT

## 2025-03-27 ENCOUNTER — APPOINTMENT (OUTPATIENT)
Dept: NEUROSURGERY | Facility: CLINIC | Age: 70
End: 2025-03-27

## 2025-04-09 ENCOUNTER — APPOINTMENT (OUTPATIENT)
Dept: NEUROSURGERY | Facility: HOSPITAL | Age: 70
End: 2025-04-09

## 2025-06-09 ENCOUNTER — APPOINTMENT (OUTPATIENT)
Dept: OPHTHALMOLOGY | Facility: CLINIC | Age: 70
End: 2025-06-09

## 2025-06-27 ENCOUNTER — APPOINTMENT (OUTPATIENT)
Dept: PAIN MANAGEMENT | Facility: CLINIC | Age: 70
End: 2025-06-27

## 2025-08-11 ENCOUNTER — APPOINTMENT (OUTPATIENT)
Dept: NEUROSURGERY | Facility: CLINIC | Age: 70
End: 2025-08-11

## 2025-08-11 ENCOUNTER — NON-APPOINTMENT (OUTPATIENT)
Age: 70
End: 2025-08-11

## 2025-08-11 VITALS — HEIGHT: 66 IN | WEIGHT: 140 LBS | BODY MASS INDEX: 22.5 KG/M2

## 2025-09-05 ENCOUNTER — APPOINTMENT (OUTPATIENT)
Facility: CLINIC | Age: 70
End: 2025-09-05